# Patient Record
Sex: FEMALE | HISPANIC OR LATINO | Employment: UNEMPLOYED | URBAN - METROPOLITAN AREA
[De-identification: names, ages, dates, MRNs, and addresses within clinical notes are randomized per-mention and may not be internally consistent; named-entity substitution may affect disease eponyms.]

---

## 2022-11-16 ENCOUNTER — TELEPHONE (OUTPATIENT)
Dept: FAMILY MEDICINE | Facility: CLINIC | Age: 37
End: 2022-11-16

## 2022-11-16 ENCOUNTER — MEDICAL CORRESPONDENCE (OUTPATIENT)
Dept: HEALTH INFORMATION MANAGEMENT | Facility: CLINIC | Age: 37
End: 2022-11-16

## 2022-12-21 ENCOUNTER — MEDICAL CORRESPONDENCE (OUTPATIENT)
Dept: HEALTH INFORMATION MANAGEMENT | Facility: CLINIC | Age: 37
End: 2022-12-21

## 2023-03-02 ENCOUNTER — MEDICAL CORRESPONDENCE (OUTPATIENT)
Dept: HEALTH INFORMATION MANAGEMENT | Facility: CLINIC | Age: 38
End: 2023-03-02

## 2023-05-04 ENCOUNTER — HOSPITAL ENCOUNTER (OUTPATIENT)
Facility: HOSPITAL | Age: 38
Setting detail: OBSERVATION
Discharge: HOME | End: 2023-05-04
Attending: EMERGENCY MEDICINE | Admitting: SURGERY
Payer: COMMERCIAL

## 2023-05-04 ENCOUNTER — ANESTHESIA (OUTPATIENT)
Dept: SURGERY | Facility: HOSPITAL | Age: 38
End: 2023-05-04
Payer: COMMERCIAL

## 2023-05-04 ENCOUNTER — ANESTHESIA EVENT (OUTPATIENT)
Dept: SURGERY | Facility: HOSPITAL | Age: 38
End: 2023-05-04
Payer: COMMERCIAL

## 2023-05-04 ENCOUNTER — HOSPITAL ENCOUNTER (OUTPATIENT)
Facility: HOSPITAL | Age: 38
End: 2023-05-04
Attending: SURGERY | Admitting: SURGERY
Payer: COMMERCIAL

## 2023-05-04 ENCOUNTER — APPOINTMENT (OUTPATIENT)
Dept: ULTRASOUND IMAGING | Facility: HOSPITAL | Age: 38
End: 2023-05-04
Attending: EMERGENCY MEDICINE
Payer: COMMERCIAL

## 2023-05-04 VITALS
WEIGHT: 159 LBS | TEMPERATURE: 97.4 F | SYSTOLIC BLOOD PRESSURE: 111 MMHG | BODY MASS INDEX: 28.17 KG/M2 | DIASTOLIC BLOOD PRESSURE: 69 MMHG | HEART RATE: 64 BPM | RESPIRATION RATE: 12 BRPM | HEIGHT: 63 IN | OXYGEN SATURATION: 100 %

## 2023-05-04 DIAGNOSIS — K80.20 SYMPTOMATIC CHOLELITHIASIS: Primary | ICD-10-CM

## 2023-05-04 DIAGNOSIS — K80.20 CALCULUS OF GALLBLADDER WITHOUT CHOLECYSTITIS WITHOUT OBSTRUCTION: ICD-10-CM

## 2023-05-04 DIAGNOSIS — K80.50 BILIARY COLIC: ICD-10-CM

## 2023-05-04 PROBLEM — N89.8 VAGINAL DISCHARGE: Status: ACTIVE | Noted: 2022-10-17

## 2023-05-04 LAB
ALBUMIN SERPL BCG-MCNC: 4.5 G/DL (ref 3.5–5.2)
ALP SERPL-CCNC: 92 U/L (ref 35–104)
ALT SERPL W P-5'-P-CCNC: 17 U/L (ref 10–35)
ANION GAP SERPL CALCULATED.3IONS-SCNC: 13 MMOL/L (ref 7–15)
AST SERPL W P-5'-P-CCNC: 30 U/L (ref 10–35)
BASOPHILS # BLD AUTO: 0 10E3/UL (ref 0–0.2)
BASOPHILS NFR BLD AUTO: 0 %
BILIRUB DIRECT SERPL-MCNC: <0.2 MG/DL (ref 0–0.3)
BILIRUB SERPL-MCNC: 0.3 MG/DL
BUN SERPL-MCNC: 8.8 MG/DL (ref 6–20)
CALCIUM SERPL-MCNC: 9.5 MG/DL (ref 8.6–10)
CHLORIDE SERPL-SCNC: 102 MMOL/L (ref 98–107)
CREAT SERPL-MCNC: 0.57 MG/DL (ref 0.51–0.95)
DEPRECATED HCO3 PLAS-SCNC: 22 MMOL/L (ref 22–29)
EOSINOPHIL # BLD AUTO: 0.4 10E3/UL (ref 0–0.7)
EOSINOPHIL NFR BLD AUTO: 4 %
ERYTHROCYTE [DISTWIDTH] IN BLOOD BY AUTOMATED COUNT: 11.9 % (ref 10–15)
GFR SERPL CREATININE-BSD FRML MDRD: >90 ML/MIN/1.73M2
GLUCOSE SERPL-MCNC: 92 MG/DL (ref 70–99)
HCG INTACT+B SERPL-ACNC: <1 MIU/ML
HCT VFR BLD AUTO: 40.4 % (ref 35–47)
HGB BLD-MCNC: 13.4 G/DL (ref 11.7–15.7)
HOLD SPECIMEN: NORMAL
IMM GRANULOCYTES # BLD: 0 10E3/UL
IMM GRANULOCYTES NFR BLD: 0 %
LIPASE SERPL-CCNC: 36 U/L (ref 13–60)
LYMPHOCYTES # BLD AUTO: 3.3 10E3/UL (ref 0.8–5.3)
LYMPHOCYTES NFR BLD AUTO: 40 %
MCH RBC QN AUTO: 31.2 PG (ref 26.5–33)
MCHC RBC AUTO-ENTMCNC: 33.2 G/DL (ref 31.5–36.5)
MCV RBC AUTO: 94 FL (ref 78–100)
MONOCYTES # BLD AUTO: 0.6 10E3/UL (ref 0–1.3)
MONOCYTES NFR BLD AUTO: 8 %
NEUTROPHILS # BLD AUTO: 3.9 10E3/UL (ref 1.6–8.3)
NEUTROPHILS NFR BLD AUTO: 48 %
NRBC # BLD AUTO: 0 10E3/UL
NRBC BLD AUTO-RTO: 0 /100
PLATELET # BLD AUTO: 297 10E3/UL (ref 150–450)
POTASSIUM SERPL-SCNC: 3.9 MMOL/L (ref 3.4–5.3)
PROT SERPL-MCNC: 8 G/DL (ref 6.4–8.3)
RBC # BLD AUTO: 4.3 10E6/UL (ref 3.8–5.2)
SODIUM SERPL-SCNC: 137 MMOL/L (ref 136–145)
WBC # BLD AUTO: 8.2 10E3/UL (ref 4–11)

## 2023-05-04 PROCEDURE — 36415 COLL VENOUS BLD VENIPUNCTURE: CPT | Performed by: EMERGENCY MEDICINE

## 2023-05-04 PROCEDURE — 96375 TX/PRO/DX INJ NEW DRUG ADDON: CPT

## 2023-05-04 PROCEDURE — 250N000011 HC RX IP 250 OP 636: Performed by: EMERGENCY MEDICINE

## 2023-05-04 PROCEDURE — 96374 THER/PROPH/DIAG INJ IV PUSH: CPT

## 2023-05-04 PROCEDURE — 80053 COMPREHEN METABOLIC PANEL: CPT | Performed by: EMERGENCY MEDICINE

## 2023-05-04 PROCEDURE — 250N000013 HC RX MED GY IP 250 OP 250 PS 637: Performed by: EMERGENCY MEDICINE

## 2023-05-04 PROCEDURE — 83690 ASSAY OF LIPASE: CPT | Performed by: EMERGENCY MEDICINE

## 2023-05-04 PROCEDURE — 999N000141 HC STATISTIC PRE-PROCEDURE NURSING ASSESSMENT: Performed by: SURGERY

## 2023-05-04 PROCEDURE — 82248 BILIRUBIN DIRECT: CPT | Performed by: EMERGENCY MEDICINE

## 2023-05-04 PROCEDURE — 76705 ECHO EXAM OF ABDOMEN: CPT

## 2023-05-04 PROCEDURE — 85025 COMPLETE CBC W/AUTO DIFF WBC: CPT | Performed by: EMERGENCY MEDICINE

## 2023-05-04 PROCEDURE — 99285 EMERGENCY DEPT VISIT HI MDM: CPT | Mod: 25

## 2023-05-04 PROCEDURE — 258N000003 HC RX IP 258 OP 636: Performed by: EMERGENCY MEDICINE

## 2023-05-04 PROCEDURE — 84702 CHORIONIC GONADOTROPIN TEST: CPT | Performed by: EMERGENCY MEDICINE

## 2023-05-04 PROCEDURE — G0378 HOSPITAL OBSERVATION PER HR: HCPCS

## 2023-05-04 PROCEDURE — 99253 IP/OBS CNSLTJ NEW/EST LOW 45: CPT | Performed by: SURGERY

## 2023-05-04 RX ORDER — SODIUM CHLORIDE 9 MG/ML
INJECTION, SOLUTION INTRAVENOUS ONCE
Status: COMPLETED | OUTPATIENT
Start: 2023-05-04 | End: 2023-05-04

## 2023-05-04 RX ORDER — HEPARIN SODIUM 5000 [USP'U]/.5ML
5000 INJECTION, SOLUTION INTRAVENOUS; SUBCUTANEOUS
Status: DISCONTINUED | OUTPATIENT
Start: 2023-05-04 | End: 2023-05-04 | Stop reason: HOSPADM

## 2023-05-04 RX ORDER — LIDOCAINE 40 MG/G
CREAM TOPICAL
Status: DISCONTINUED | OUTPATIENT
Start: 2023-05-04 | End: 2023-05-04 | Stop reason: HOSPADM

## 2023-05-04 RX ORDER — MECLIZINE HCL 12.5 MG 12.5 MG/1
25 TABLET ORAL ONCE
Status: COMPLETED | OUTPATIENT
Start: 2023-05-04 | End: 2023-05-04

## 2023-05-04 RX ORDER — OXYCODONE HYDROCHLORIDE 5 MG/1
5 TABLET ORAL EVERY 6 HOURS PRN
Qty: 6 TABLET | Refills: 0 | Status: SHIPPED | OUTPATIENT
Start: 2023-05-04 | End: 2023-05-08

## 2023-05-04 RX ORDER — DIPHENHYDRAMINE HYDROCHLORIDE 50 MG/ML
12.5 INJECTION INTRAMUSCULAR; INTRAVENOUS ONCE
Status: COMPLETED | OUTPATIENT
Start: 2023-05-04 | End: 2023-05-04

## 2023-05-04 RX ORDER — SODIUM CHLORIDE, SODIUM LACTATE, POTASSIUM CHLORIDE, CALCIUM CHLORIDE 600; 310; 30; 20 MG/100ML; MG/100ML; MG/100ML; MG/100ML
INJECTION, SOLUTION INTRAVENOUS CONTINUOUS
Status: DISCONTINUED | OUTPATIENT
Start: 2023-05-04 | End: 2023-05-04 | Stop reason: HOSPADM

## 2023-05-04 RX ORDER — CLINDAMYCIN PHOSPHATE 900 MG/50ML
900 INJECTION, SOLUTION INTRAVENOUS
Status: DISCONTINUED | OUTPATIENT
Start: 2023-05-04 | End: 2023-05-04 | Stop reason: HOSPADM

## 2023-05-04 RX ORDER — ONDANSETRON 2 MG/ML
8 INJECTION INTRAMUSCULAR; INTRAVENOUS ONCE
Status: COMPLETED | OUTPATIENT
Start: 2023-05-04 | End: 2023-05-04

## 2023-05-04 RX ORDER — METOCLOPRAMIDE HYDROCHLORIDE 5 MG/ML
10 INJECTION INTRAMUSCULAR; INTRAVENOUS ONCE
Status: COMPLETED | OUTPATIENT
Start: 2023-05-04 | End: 2023-05-04

## 2023-05-04 RX ORDER — KETOROLAC TROMETHAMINE 15 MG/ML
15 INJECTION, SOLUTION INTRAMUSCULAR; INTRAVENOUS ONCE
Status: COMPLETED | OUTPATIENT
Start: 2023-05-04 | End: 2023-05-04

## 2023-05-04 RX ORDER — CLINDAMYCIN PHOSPHATE 900 MG/50ML
900 INJECTION, SOLUTION INTRAVENOUS SEE ADMIN INSTRUCTIONS
Status: DISCONTINUED | OUTPATIENT
Start: 2023-05-04 | End: 2023-05-04 | Stop reason: HOSPADM

## 2023-05-04 RX ORDER — HYDROMORPHONE HYDROCHLORIDE 1 MG/ML
0.5 INJECTION, SOLUTION INTRAMUSCULAR; INTRAVENOUS; SUBCUTANEOUS
Status: DISCONTINUED | OUTPATIENT
Start: 2023-05-04 | End: 2023-05-04 | Stop reason: HOSPADM

## 2023-05-04 RX ADMIN — ONDANSETRON 8 MG: 2 INJECTION INTRAMUSCULAR; INTRAVENOUS at 12:19

## 2023-05-04 RX ADMIN — HYDROMORPHONE HYDROCHLORIDE 0.5 MG: 1 INJECTION, SOLUTION INTRAMUSCULAR; INTRAVENOUS; SUBCUTANEOUS at 12:17

## 2023-05-04 RX ADMIN — METOCLOPRAMIDE 10 MG: 5 INJECTION, SOLUTION INTRAMUSCULAR; INTRAVENOUS at 15:06

## 2023-05-04 RX ADMIN — DIPHENHYDRAMINE HYDROCHLORIDE 12.5 MG: 50 INJECTION, SOLUTION INTRAMUSCULAR; INTRAVENOUS at 15:05

## 2023-05-04 RX ADMIN — SODIUM CHLORIDE: 9 INJECTION, SOLUTION INTRAVENOUS at 14:55

## 2023-05-04 RX ADMIN — MECLIZINE 25 MG: 12.5 TABLET ORAL at 13:22

## 2023-05-04 RX ADMIN — KETOROLAC TROMETHAMINE 15 MG: 15 INJECTION, SOLUTION INTRAMUSCULAR; INTRAVENOUS at 12:16

## 2023-05-04 ASSESSMENT — ENCOUNTER SYMPTOMS
CHILLS: 1
FEVER: 0
ABDOMINAL PAIN: 1
DIZZINESS: 1
HEADACHES: 1
NAUSEA: 1

## 2023-05-04 ASSESSMENT — ACTIVITIES OF DAILY LIVING (ADL)
ADLS_ACUITY_SCORE: 35
ADLS_ACUITY_SCORE: 35
DEPENDENT_IADLS:: INDEPENDENT
ADLS_ACUITY_SCORE: 35
ADLS_ACUITY_SCORE: 35

## 2023-05-04 NOTE — PHARMACY-ADMISSION MEDICATION HISTORY
Pharmacist Admission Medication History    Admission medication history is complete. The information provided in this note is only as accurate as the sources available at the time of the update.    Medication reconciliation/reorder completed by provider prior to medication history? No    Information Source(s): Patient via phone    Pertinent Information: Patient takes no medications PTA.    Changes made to PTA medication list:    Added: None    Deleted: None    Changed: None    Medication Affordability:  Not including over the counter (OTC) medications, was there a time in the past 12 months when you did not take your medications as prescribed because of cost?: No    Allergies reviewed with patient and updates made in EHR: yes    Medication History Completed By: ERIC MCKEON RPH 5/4/2023 2:54 PM    Prior to Admission medications    Not on File

## 2023-05-04 NOTE — ED PROVIDER NOTES
EMERGENCY DEPARTMENT ENCOUNTER      NAME: Caitlyn Hu  AGE: 37 year old female  YOB: 1985  MRN: [id number]  EVALUATION DATE & TIME: 5/4/2023 12:11 PM    PCP: No primary care provider on file.    ED PROVIDER: Cameron Moore M.D.      Chief Complaint   Patient presents with     Abdominal Pain         FINAL IMPRESSION:  1. Biliary colic    2. Calculus of gallbladder without cholecystitis without obstruction          ED COURSE & MEDICAL DECISION MAKING:    Pertinent Labs & Imaging studies reviewed below.  All EKGs below represent my independent interpretation.   ED Course as of 05/04/23 1519   u May 04, 2023   1226 Patient is a 37-year-old woman who presents with epigastric department regarding pain an hour prior to arrival.  She is nauseous, has vomited, tenderness to the right upper quadrant.  She reports a history of biliary colic, but has not been able to see outpatient surgery.  Symptoms consistent with biliary colic, but plan to screen blood work with leukocytosis, signs of biliary obstruction.  Ultrasound ordered.  IV analgesia, antiemetics ordered.   1229 Bilirubin Direct: <0.20   1433 Abdomen US, limited (RUQ only)  1.  Cholelithiasis in the otherwise normal-appearing gallbladder. Positive sonographic Ochoa's sign. Relief of pain after gallbladder neck stone dislodged as described above.   1433 Surgery paged   1444 Updated the patient, discussed my recommendation for admission for symptomatic management, surgery consult, likely cholecystectomy tomorrow.  I also ordered Reglan, Benadryl, she reports having headache at this time.     I spoke to Dr. Cesario Zuniga who agrees with plan for cholecystectomy based on my description and lab work.  He is in the operating room currently, but will need to swing by or have someone discuss with patient plan for cholecystectomy either tonight or tomorrow.    Additional ED Course Timestamps:  12:02 PM I met with the patient to gather history and to  perform my initial exam. I discussed the plan for care while in the Emergency Department. PPE was worn during patient encounters.     Medical Decision Making    History:    Supplemental history from: Documented in chart, if applicable    External Record(s) reviewed: Documented in chart, if applicable.    Work Up:    Chart documentation includes differential considered and any EKGs or imaging independently interpreted by provider, where specified.    In additional to work up documented, I considered the following work up: Documented in chart, if applicable.    External consultation:    Discussion of management with another provider: Documented in chart, if applicable    Complicating factors:    Care impacted by chronic illness: N/A    Care affected by social determinants of health: N/A    Disposition considerations: Admit.        At the conclusion of the encounter I discussed the results of all of the tests and the disposition. The questions were answered. The patient or family acknowledged understanding and was agreeable with the care plan.       MEDICATIONS GIVEN IN THE EMERGENCY:  Medications   HYDROmorphone (PF) (DILAUDID) injection 0.5 mg (0.5 mg Intravenous $Given 5/4/23 1217)   ketorolac (TORADOL) injection 15 mg (15 mg Intravenous $Given 5/4/23 1216)   ondansetron (ZOFRAN) injection 8 mg (8 mg Intravenous $Given 5/4/23 1219)   meclizine (ANTIVERT) tablet 25 mg (25 mg Oral $Given 5/4/23 1322)   metoclopramide (REGLAN) injection 10 mg (10 mg Intravenous $Given 5/4/23 1506)   diphenhydrAMINE (BENADRYL) injection 12.5 mg (12.5 mg Intravenous $Given 5/4/23 1505)   sodium chloride 0.9% infusion ( Intravenous $New Bag 5/4/23 1455)         NEW PRESCRIPTIONS STARTED AT TODAY'S ER VISIT  New Prescriptions    No medications on file          =================================================================    HPI    Patient information was obtained from: patient    Use of : Yes (Phone) - Language Cook Islander       "  Caitlyn Hu is a 37 year old female with no contributory medical history who presents to this ED for evaluation of abdominal pain. Patient reports that she had onset of severe epigastric abdominal pain with radiation to her right upper quadrant. She reports associated nausea, headache, dizziness, and chills.    Patient reports that 1 month ago, she was in the hospital, and told that she may need her gallbladder removed, but she never received a call from the general surgeon to schedule her cholecystectomy. Patient denies chance of pregnancy. Patient denies fever, or additional medical concerns or complaints at this time.        Per chart review, patient was seen at [facility] ED on 2/24/23 for evaluation of abdominal pain. Patient's labs were unremarkable . Patient was provided with a surgery referral for follow-up in clinic if she remained symptomatic.     REVIEW OF SYSTEMS   Review of Systems   Constitutional: Positive for chills. Negative for fever.   Gastrointestinal: Positive for abdominal pain and nausea.   Neurological: Positive for dizziness and headaches.   All other systems reviewed and are negative.       VITALS:  /65   Pulse 73   Temp 97.4  F (36.3  C) (Temporal)   Resp 20   Ht 1.6 m (5' 3\")   Wt 72.1 kg (159 lb)   SpO2 99%   BMI 28.17 kg/m      PHYSICAL EXAM    Constitutional: Well developed, well nourished. Uncomfortable appearing.  HENT: Normocephalic, atraumatic, mucous membranes moist, nose normal. Neck- Supple, gross ROM intact.   Eyes: Pupils mid-range, conjunctiva without injection, no discharge.   Respiratory: Clear to auscultation bilaterally, no respiratory distress, no wheezing, speaks full sentences easily. No cough.  Cardiovascular: Normal heart rate, regular rhythm, no murmurs.   GI: Soft, no masses. Tenderness to palpation in epigastric region and RUQ.  Musculoskeletal: Moving all 4 extremities intentionally and without pain. No obvious deformity.  Skin: Warm, " dry, no rash.  Neurologic: Alert & oriented x 3, cranial nerves grossly intact.  Psychiatric: Affect normal, cooperative.        I, Elise Gryler am serving as a scribe to document services personally performed by Dr. Cameron Moore based on my observation and the provider's statements to me. I, Cameron Moore MD attest that Elise Gryler is acting in a scribe capacity, has observed my performance of the services and has documented them in accordance with my direction.    Cameron Moore M.D.  Emergency Medicine  [facility] EMERGENCY DEPARTMENT  [address] [place] [id number]  [phone]  Dept: [phone]       Cameron Moore MD  05/04/23 2532

## 2023-05-04 NOTE — ED TRIAGE NOTES
The pt presents with abdominal pain 10/10 states she was suppose to have gallbladder surgery a month ago. The pt states nausea and vomiting multiple times. The pt appears very uncomfortable.

## 2023-05-04 NOTE — PLAN OF CARE
Pt discharged from [facility]. Procedure not done, explanation by surgeon.  Paperwork sent with patient. Discharge teaching completed with aide of .

## 2023-05-04 NOTE — CONSULTS
"History:   Caitlyn Hutchison is a 37 year old female referred to general surgery by Dr. Moore for cholelithiasis.  History is obtained with the assistance of a  over the phone.  She presented to the ED with a complaint of abdominal pain.  It started around 11 AM.  The pain has been located in the epigastrium.  She describes it as crampy.  It radiated to her back.  It was associated with nausea and vomiting.  She has had symptoms like this in the past.  1 month ago she was in [place] and was diagnosed with gallstones.  She was told that she would have follow-up with surgery, but it never happened.  She has had associated symptoms of gassiness and bloating.  She feels like her epigastrium was very hard.  Her symptoms have now resolved.  While she was in radiology she was found to have a gallstone at the neck of the gallbladder.  When turning prone, the stone moved out of the neck and her symptoms went away.      Allergies:  Penicillins and Latex    Past medical history:  Denies    Past surgical history:  Appendectomy  Tubal ligation    Current medications:  Denies    Family history:  No known family history of anesthesia problems    Social history:  Denies alcohol, tobacco, illicit drug use    Review of Systems:  General: No complaints or constitutional symptoms  Skin: No complaints or symptoms   Hematologic/Lymphatic: No symptoms or complaints  Psychiatric: No symptoms or complaints  Endocrine: No excessive fatigue, no hypermetabolic symptoms reported  Respiratory: No cough, shortness of breath, or wheezing  Cardiovascular: No chest pain or dyspnea on exertion  Gastrointestinal: As per HPI  Musculoskeletal: No recent injuries reported  Neurological: No focal neurologic defects reported.      Exam:  /69 (BP Location: Left arm)   Pulse 64   Temp 97.4  F (36.3  C) (Temporal)   Resp 12   Ht 1.6 m (5' 3\")   Wt 72.1 kg (159 lb)   LMP 05/04/2023   SpO2 100%   BMI 28.17 kg/m  "   Body mass index is 28.17 kg/m .  General: Alert, cooperative, appears stated age   Skin: Skin color, texture, turgor normal, no rashes or lesions   Lymphatic: No obvious adenopathy, no swelling   Eyes: No scleral icterus, pupils equal  HENT: No traumatic injury to the head or face, no gross abnormalities  Lungs: Normal respiratory effort, breath sounds equal bilaterally  Heart: Regular rate and rhythm  Abdomen: Soft, nondistended, nontender to palpation  Musculoskeletal: No obvious swelling or deformities  Neurologic: Grossly intact      Imaging:   Pertinent images personally reviewed by myself and discussed with the patient.    Radiology reports:  EXAM: US ABDOMEN LIMITED  LOCATION: [facility]  DATE/TIME: 5/4/2023 2:17 PM CDT     INDICATION: Biliary colic.  COMPARISON: None.  TECHNIQUE: Limited abdominal ultrasound.     FINDINGS:  GALLBLADDER: Shadowing 2.5 cm and 1.5 cm gallstones. The 1.5 cm gallstone was lodged in the neck of the gallbladder until the patient was moved to a prone position when it dislodged and simultaneously the patient experienced relief of pain. No gallbladder wall thickening or pericholecystic fluid. Positive sonographic Ochoa's sign.  BILE DUCTS: No biliary dilatation. The common duct measures 3 mm.  LIVER: Normal parenchyma with smooth contour. No focal mass.  RIGHT KIDNEY: No hydronephrosis.  PANCREAS: The visualized portions are normal.  No ascites.                                                                   IMPRESSION:  1.  Cholelithiasis in the otherwise normal-appearing gallbladder. Positive sonographic Ochoa's sign. Relief of pain after gallbladder neck stone dislodged as described above.       Assessment/Plan:   Caitlyn Hutchison is a 37 year old female with signs and symptoms consistent with biliary colic.  I have explained the pathophysiology of gallbladder disease in detail as well as the surgical versus non-operative management  strategies.  The risks of surgery and anesthesia include, but are not limited to, bleeding, infection, injury to surrounding structures, the need to convert to an open procedure, blood clots, stroke, heart attack and death.  Specifically we discussed the risk of damage to the common bile duct and hepatic vessels, and the complications that may arise from damage to these structures.  Additionally, the risks of non-operative management were discussed which include, but are not limited to, infection, recurrent pain, sepsis and death.     She understands everything which was discussed and is interested in pursuing surgical management.  Unfortunately the OR is backed up and bumping cases.  There is not a reasonable time for surgery this evening.  Therefore, she will be discharged home with outpatient follow-up for cholecystectomy.  Preoperative orders have been placed for laparoscopic cholecystectomy.  My surgery scheduler will give her a call to pick a date for her cholecystectomy.     Adelaida Miranda DO  General Surgeon  [facility]  Surgery [address]  [place] [address]  Office: [phone]  Employed by - [facility]

## 2023-05-04 NOTE — ED NOTES
Provider notified of pt's decreasing blood pressures as well as her dizziness since Dilaudid was given. New order placed.

## 2023-05-04 NOTE — PROGRESS NOTES
37-year-old female presenting with symptomatic cholelithiasis.  The general surgery team was consulted to evaluate this patient.  Our plan is to proceed with laparoscopic cholecystectomy around 5:30 PM.  Likely patient will be discharged thereafter.    Plan  -Keep n.p.o. for now  -Plan for OR  -Full H&P to follow    Cesario Zuniga DO  General Surgeon  [facility]  Surgery [facility] - [address] [address]?  Office: [phone]  Employed by - [facility]  Pager: [phone]

## 2023-05-04 NOTE — CONSULTS
Care Management Initial Consult    General Information  Assessment completed with: Caitlyn Wells via phone with   Type of CM/SW Visit: Initial Assessment    Primary Care Provider verified and updated as needed: Yes   Readmission within the last 30 days: no previous admission in last 30 days      Reason for Consult: discharge planning  Advance Care Planning: Advance Care Planning Reviewed: no concerns identified          Communication Assessment  Patient's communication style: spoken language (non-English) (speaks Azeri)             Cognitive  Cognitive/Neuro/Behavioral:                        Living Environment:   People in home: spouse, child(edilson), dependent     Current living Arrangements: house      Able to return to prior arrangements: yes       Family/Social Support:  Care provided by: self  Provides care for: child(edilson)  Marital Status:     Anuj       Description of Support System: Supportive, Involved    Support Assessment: Adequate family and caregiver support, Adequate social supports, Patient communicates needs well met    Current Resources:   Patient receiving home care services: No     Community Resources: None  Equipment currently used at home:    Supplies currently used at home: None    Employment/Financial:  Employment Status: unemployed        Financial Concerns:     Referral to Financial Worker: No       Does the patient's insurance plan have a 3 day qualifying hospital stay waiver?  No    Lifestyle & Psychosocial Needs:  Social Determinants of Health     Tobacco Use: Not on file   Alcohol Use: Not on file   Financial Resource Strain: Not on file   Food Insecurity: Not on file   Transportation Needs: Not on file   Physical Activity: Not on file   Stress: Not on file   Social Connections: Not on file   Intimate Partner Violence: Not on file   Depression: Not on file   Housing Stability: Not on file       Functional Status:  Prior to admission patient needed assistance:    Dependent ADLs:: Independent, Ambulation-no assistive device  Dependent IADLs:: Independent  Assesssment of Functional Status: At functional baseline    Mental Health Status:          Chemical Dependency Status:                Values/Beliefs:  Spiritual, Cultural Beliefs, Episcopal Practices, Values that affect care:                 Additional Information:  Caitlyn lives in a house with her  and kids. She is independent with ADLs at baseline.    Likely home with no new needs at this time.    Family to transport at discharge.    CM to follow for medical progression of care, discharge recommendations, and final discharge plan.    Linda Welch RN

## 2023-05-05 ENCOUNTER — TELEPHONE (OUTPATIENT)
Dept: SURGERY | Facility: CLINIC | Age: 38
End: 2023-05-05
Payer: COMMERCIAL

## 2023-05-05 NOTE — TELEPHONE ENCOUNTER
Talked with Caitlyn through a  and scheduled her surgery with Dr. Zuniga for 05.11.23. We went over all details in case she doesn't get the confirmation letter in the mail in time. She's in agreement with the plan.

## 2023-05-05 NOTE — LETTER
Thank you for choosing [facility] General Surgery for your care. You are scheduled for surgery with Dr. Zuniga. Details are as follows:    Pre-op Physical: Dr. Zuniga will complete in pre-op the day of surgery    Surgery Date: 05/11/2023     Location: [place] [address]. Check in on 3rd floor; Left off the elevator    Approximate Arrival Time: 9:00 a.m. (time subject to change)  Surgery Start Time: 10:00 a.m. (time subject to change)     Prep Tasks and Info:     Review your medications with your primary care or prescribing physician; they will advise you which meds to stop and when, and when you can resume taking.  Certain medications like blood thinners, need to be stopped in advance of surgery to proceed safely.    You must get tested for COVID-19, even if you are vaccinated.    Outpatient Surgery:  If you are going home the same day of surgery, a home rapid antigen Covid-19 test is required 1-2 days before surgery- regardless of your vaccination status.  Take a photo of the negative result and show to the nurse on the day of surgery. If you test positive, contact our office right away to reschedule surgery. You can buy a home Covid-19 Rapid Antigen test at many local pharmacies, or you can order for free at covid.gov/tests.    Please shower the evening before and morning of surgery with Hibiclens soap. This can be found at your local pharmacy.     Fasting instructions will be provided by the pre-op nurse who will call you 1-3 days before surgery.  Typically we advise normal food up to 8 hours before you arrive for surgery. Clear liquids only from then until 2 hours before you arrive surgery then nothing at all by mouth.  The nurse will review your specific instructions with you at the call.     Smoking impacts your body's ability to heal properly.  If you are a smoker, we strongly urge you to stop smoking 4-6 weeks before surgery. Plastic surgery  patients ar required to be off nicotine for at least 8 weeks before surgery.    You will need an adult to drive you home and stay with you 24 hours after surgery. Public transportation or Medical Van Services are not permitted.    You may have one family member wait in the lobby at the surgery center during your surgery. Visitor restrictions are subject to change, please verify with the pre-op nurse when they call.    If the community sees a new COVID19 surge, your procedure may need to be postponed. We will contact you if this happens. You will be screened for high-risk exposure to Covid-19 during the pre-op call.  We encourage you to quarantine yourself away from any Covid-19 people for 10 days before surgery to avoid possible last minute cancellations.   When you arrive to the surgery center, you will again be screened for COVID19 symptoms. If you screen positive, your surgery will need to be postponed.    We always encourage you to notify your insurance any time you have medical tests or procedures scheduled including surgery. The number is usually right on the back of your insurance card. Please call [facility] Cost of Care at [phone]  if you'd like a surgery quote.     Call our office if you have any questions! Thank you!

## 2023-05-10 ENCOUNTER — ANESTHESIA EVENT (OUTPATIENT)
Dept: SURGERY | Facility: AMBULATORY SURGERY CENTER | Age: 38
End: 2023-05-10
Payer: COMMERCIAL

## 2023-05-11 ENCOUNTER — ANESTHESIA (OUTPATIENT)
Dept: SURGERY | Facility: AMBULATORY SURGERY CENTER | Age: 38
End: 2023-05-11
Payer: COMMERCIAL

## 2023-05-11 ENCOUNTER — HOSPITAL ENCOUNTER (OUTPATIENT)
Facility: AMBULATORY SURGERY CENTER | Age: 38
Discharge: HOME | End: 2023-05-11
Attending: SURGERY
Payer: COMMERCIAL

## 2023-05-11 VITALS
DIASTOLIC BLOOD PRESSURE: 77 MMHG | RESPIRATION RATE: 16 BRPM | BODY MASS INDEX: 24.63 KG/M2 | HEART RATE: 62 BPM | WEIGHT: 139 LBS | HEIGHT: 63 IN | SYSTOLIC BLOOD PRESSURE: 123 MMHG | OXYGEN SATURATION: 97 % | TEMPERATURE: 97.2 F

## 2023-05-11 DIAGNOSIS — K80.50 BILIARY COLIC: Primary | ICD-10-CM

## 2023-05-11 LAB
HCG UR QL: NEGATIVE
INTERNAL QC OK POCT: NORMAL
POCT KIT EXPIRATION DATE: NORMAL
POCT KIT LOT NUMBER: NORMAL

## 2023-05-11 PROCEDURE — 47562 LAPAROSCOPIC CHOLECYSTECTOMY: CPT | Performed by: SURGERY

## 2023-05-11 RX ORDER — SODIUM CHLORIDE, SODIUM LACTATE, POTASSIUM CHLORIDE, CALCIUM CHLORIDE 600; 310; 30; 20 MG/100ML; MG/100ML; MG/100ML; MG/100ML
INJECTION, SOLUTION INTRAVENOUS CONTINUOUS
Status: DISCONTINUED | OUTPATIENT
Start: 2023-05-11 | End: 2023-05-12 | Stop reason: HOSPADM

## 2023-05-11 RX ORDER — BUPIVACAINE HYDROCHLORIDE 2.5 MG/ML
INJECTION, SOLUTION INFILTRATION; PERINEURAL PRN
Status: DISCONTINUED | OUTPATIENT
Start: 2023-05-11 | End: 2023-05-11 | Stop reason: HOSPADM

## 2023-05-11 RX ORDER — FENTANYL CITRATE 50 UG/ML
INJECTION, SOLUTION INTRAMUSCULAR; INTRAVENOUS PRN
Status: DISCONTINUED | OUTPATIENT
Start: 2023-05-11 | End: 2023-05-11

## 2023-05-11 RX ORDER — ONDANSETRON 4 MG/1
4 TABLET, ORALLY DISINTEGRATING ORAL EVERY 30 MIN PRN
Status: DISCONTINUED | OUTPATIENT
Start: 2023-05-11 | End: 2023-05-12 | Stop reason: HOSPADM

## 2023-05-11 RX ORDER — HYDROCODONE BITARTRATE AND ACETAMINOPHEN 5; 325 MG/1; MG/1
1 TABLET ORAL EVERY 6 HOURS PRN
Qty: 12 TABLET | Refills: 0 | Status: SHIPPED | OUTPATIENT
Start: 2023-05-11 | End: 2023-05-14

## 2023-05-11 RX ORDER — ONDANSETRON 2 MG/ML
INJECTION INTRAMUSCULAR; INTRAVENOUS PRN
Status: DISCONTINUED | OUTPATIENT
Start: 2023-05-11 | End: 2023-05-11

## 2023-05-11 RX ORDER — LIDOCAINE HYDROCHLORIDE 20 MG/ML
INJECTION, SOLUTION INFILTRATION; PERINEURAL PRN
Status: DISCONTINUED | OUTPATIENT
Start: 2023-05-11 | End: 2023-05-11

## 2023-05-11 RX ORDER — DEXAMETHASONE SODIUM PHOSPHATE 4 MG/ML
INJECTION, SOLUTION INTRA-ARTICULAR; INTRALESIONAL; INTRAMUSCULAR; INTRAVENOUS; SOFT TISSUE PRN
Status: DISCONTINUED | OUTPATIENT
Start: 2023-05-11 | End: 2023-05-11

## 2023-05-11 RX ORDER — FENTANYL CITRATE 0.05 MG/ML
50 INJECTION, SOLUTION INTRAMUSCULAR; INTRAVENOUS EVERY 5 MIN PRN
Status: DISCONTINUED | OUTPATIENT
Start: 2023-05-11 | End: 2023-05-12 | Stop reason: HOSPADM

## 2023-05-11 RX ORDER — FENTANYL CITRATE 0.05 MG/ML
25 INJECTION, SOLUTION INTRAMUSCULAR; INTRAVENOUS EVERY 5 MIN PRN
Status: DISCONTINUED | OUTPATIENT
Start: 2023-05-11 | End: 2023-05-12 | Stop reason: HOSPADM

## 2023-05-11 RX ORDER — MAGNESIUM SULFATE HEPTAHYDRATE 40 MG/ML
4 INJECTION, SOLUTION INTRAVENOUS ONCE
Status: COMPLETED | OUTPATIENT
Start: 2023-05-11 | End: 2023-05-11

## 2023-05-11 RX ORDER — PROPOFOL 10 MG/ML
INJECTION, EMULSION INTRAVENOUS PRN
Status: DISCONTINUED | OUTPATIENT
Start: 2023-05-11 | End: 2023-05-11

## 2023-05-11 RX ORDER — OXYCODONE HYDROCHLORIDE 10 MG/1
10 TABLET ORAL
Status: DISCONTINUED | OUTPATIENT
Start: 2023-05-11 | End: 2023-05-12 | Stop reason: HOSPADM

## 2023-05-11 RX ORDER — HYDROMORPHONE HCL IN WATER/PF 6 MG/30 ML
0.2 PATIENT CONTROLLED ANALGESIA SYRINGE INTRAVENOUS EVERY 5 MIN PRN
Status: DISCONTINUED | OUTPATIENT
Start: 2023-05-11 | End: 2023-05-12 | Stop reason: HOSPADM

## 2023-05-11 RX ORDER — ACETAMINOPHEN 325 MG/1
975 TABLET ORAL ONCE
Status: COMPLETED | OUTPATIENT
Start: 2023-05-11 | End: 2023-05-11

## 2023-05-11 RX ORDER — PROPOFOL 10 MG/ML
INJECTION, EMULSION INTRAVENOUS CONTINUOUS PRN
Status: DISCONTINUED | OUTPATIENT
Start: 2023-05-11 | End: 2023-05-11

## 2023-05-11 RX ORDER — DOCUSATE SODIUM 100 MG/1
100 CAPSULE, LIQUID FILLED ORAL 2 TIMES DAILY
Qty: 30 CAPSULE | Refills: 0 | Status: SHIPPED | OUTPATIENT
Start: 2023-05-11

## 2023-05-11 RX ORDER — ONDANSETRON 2 MG/ML
4 INJECTION INTRAMUSCULAR; INTRAVENOUS EVERY 30 MIN PRN
Status: DISCONTINUED | OUTPATIENT
Start: 2023-05-11 | End: 2023-05-12 | Stop reason: HOSPADM

## 2023-05-11 RX ORDER — OXYCODONE HYDROCHLORIDE 5 MG/1
5 TABLET ORAL
Status: COMPLETED | OUTPATIENT
Start: 2023-05-11 | End: 2023-05-11

## 2023-05-11 RX ORDER — LIDOCAINE 40 MG/G
CREAM TOPICAL
Status: DISCONTINUED | OUTPATIENT
Start: 2023-05-11 | End: 2023-05-12 | Stop reason: HOSPADM

## 2023-05-11 RX ORDER — KETOROLAC TROMETHAMINE 30 MG/ML
INJECTION, SOLUTION INTRAMUSCULAR; INTRAVENOUS PRN
Status: DISCONTINUED | OUTPATIENT
Start: 2023-05-11 | End: 2023-05-11

## 2023-05-11 RX ORDER — HYDROMORPHONE HCL IN WATER/PF 6 MG/30 ML
0.4 PATIENT CONTROLLED ANALGESIA SYRINGE INTRAVENOUS EVERY 5 MIN PRN
Status: DISCONTINUED | OUTPATIENT
Start: 2023-05-11 | End: 2023-05-12 | Stop reason: HOSPADM

## 2023-05-11 RX ADMIN — KETOROLAC TROMETHAMINE 15 MG: 30 INJECTION, SOLUTION INTRAMUSCULAR; INTRAVENOUS at 10:41

## 2023-05-11 RX ADMIN — FENTANYL CITRATE 50 MCG: 50 INJECTION, SOLUTION INTRAMUSCULAR; INTRAVENOUS at 10:25

## 2023-05-11 RX ADMIN — FENTANYL CITRATE 100 MCG: 50 INJECTION, SOLUTION INTRAMUSCULAR; INTRAVENOUS at 10:02

## 2023-05-11 RX ADMIN — FENTANYL CITRATE 25 MCG: 0.05 INJECTION, SOLUTION INTRAMUSCULAR; INTRAVENOUS at 11:31

## 2023-05-11 RX ADMIN — OXYCODONE HYDROCHLORIDE 5 MG: 5 TABLET ORAL at 12:04

## 2023-05-11 RX ADMIN — FENTANYL CITRATE 25 MCG: 0.05 INJECTION, SOLUTION INTRAMUSCULAR; INTRAVENOUS at 11:20

## 2023-05-11 RX ADMIN — ACETAMINOPHEN 975 MG: 325 TABLET ORAL at 09:40

## 2023-05-11 RX ADMIN — PROPOFOL 150 MG: 10 INJECTION, EMULSION INTRAVENOUS at 10:02

## 2023-05-11 RX ADMIN — PROPOFOL 200 MCG/KG/MIN: 10 INJECTION, EMULSION INTRAVENOUS at 10:02

## 2023-05-11 RX ADMIN — Medication 100 MCG: at 10:09

## 2023-05-11 RX ADMIN — MAGNESIUM SULFATE HEPTAHYDRATE 4 G: 40 INJECTION, SOLUTION INTRAVENOUS at 09:42

## 2023-05-11 RX ADMIN — Medication 100 MCG: at 10:16

## 2023-05-11 RX ADMIN — SODIUM CHLORIDE, SODIUM LACTATE, POTASSIUM CHLORIDE, CALCIUM CHLORIDE: 600; 310; 30; 20 INJECTION, SOLUTION INTRAVENOUS at 09:42

## 2023-05-11 RX ADMIN — LIDOCAINE HYDROCHLORIDE 3 ML: 20 INJECTION, SOLUTION INFILTRATION; PERINEURAL at 10:02

## 2023-05-11 RX ADMIN — DEXAMETHASONE SODIUM PHOSPHATE 10 MG: 4 INJECTION, SOLUTION INTRA-ARTICULAR; INTRALESIONAL; INTRAMUSCULAR; INTRAVENOUS; SOFT TISSUE at 10:02

## 2023-05-11 RX ADMIN — ONDANSETRON 4 MG: 2 INJECTION INTRAMUSCULAR; INTRAVENOUS at 10:39

## 2023-05-11 RX ADMIN — Medication 50 MG: at 10:02

## 2023-05-11 NOTE — H&P
"General Surgery H&P  Caitlyn Hutchison MRN# [id number]   Age/Sex: 37 year old female YOB: 1985     Reason for visit:  Symptomatic cholelithiasis       Referring physician:  Cameron Moore MD                   Assessment and Plan:   Assessment:  1.  Symptomatic cholelithiasis    Plan:  -To the OR for laparoscopic cholecystectomy  - The risks and benefits of the procedure were explained detail to the patient. The risks include infection, bleeding, damage to the surrounding structures. Patient verbalized understanding provided consent to undergo the procedure above.  - Telephone  was used to obtain consent.          Chief Complaint:   Here for surgery     History is obtained from the patient    HPI:   Caitlyn Hutchison is a 37 year old female who presents to the [facility] today for laparoscopic cholecystectomy.  Patient has not had any acute changes to her medical history since last on that we have met.          Past Medical History:   History reviewed. No pertinent past medical history.           Past Surgical History:     Past Surgical History:   Procedure Laterality Date     APPENDECTOMY       GYN SURGERY               Social History:    reports that she has never smoked. She has never used smokeless tobacco. She reports that she does not use drugs.           Family History:   History reviewed. No pertinent family history.           Allergies:     Allergies   Allergen Reactions     Penicillins Anaphylaxis     \"throat closes\"  Pt has tolerated amoxicillin in the past       Latex Itching and Rash              Medications:     Prior to Admission medications    Not on File              Review of Systems:   A 12 point Review of Systems is negative other than noted in the HPI            Physical Exam:   No data found.     No intake or output data in the 24 hours ending 05/11/23 0806   Constitutional:   awake, alert, cooperative, no apparent distress, " and appears stated age       Eyes:   PERRL, conjunctiva/corneas clear, EOM's intact; no scleral edema or icterus noted        ENT:   Normocephalic, without obvious abnormality, atraumatic, Lips, mucosa, and tongue normal        Hematologic / Lymphatic:   No lymphadenopathy       Lungs:   Normal respiratory effort, no accessory muscle use       Cardiovascular:   Regular rate and rhythm       Abdomen:   Soft, nondistended, nontender to palpation       Musculoskeletal:   No obvious swelling, bruising or deformity       Skin:   Skin color and texture normal for patient, no rashes or lesions              Data:            Cesario Zuniga DO  General Surgeon  [facility]  Surgery [address] [address]?  Office: [phone]  Employed by - [facility] [facility] [employer]  Pager: [phone]

## 2023-05-11 NOTE — ANESTHESIA POSTPROCEDURE EVALUATION
Patient: Caitlyn Hutchison    Procedure: Procedure(s):  CHOLECYSTECTOMY, LAPAROSCOPIC       Anesthesia Type:  General    Note:  Disposition: Outpatient   Postop Pain Control: Uneventful            Sign Out: Well controlled pain   PONV: No   Neuro/Psych: Uneventful            Sign Out: Acceptable/Baseline neuro status   Airway/Respiratory: Uneventful            Sign Out: Acceptable/Baseline resp. status   CV/Hemodynamics: Uneventful            Sign Out: Acceptable CV status; No obvious hypovolemia; No obvious fluid overload   Other NRE: NONE   DID A NON-ROUTINE EVENT OCCUR? No           Last vitals:  Vitals Value Taken Time   /66 05/11/23 1136   Temp 97.7  F (36.5  C) 05/11/23 1103   Pulse 79 05/11/23 1138   Resp 16 05/11/23 1130   SpO2 97 % 05/11/23 1138   Vitals shown include unvalidated device data.    Electronically Signed By: Ez Rincon MD  May 11, 2023  11:58 AM

## 2023-05-11 NOTE — ANESTHESIA CARE TRANSFER NOTE
Patient: Caitlyn Hutchison    Procedure: Procedure(s):  CHOLECYSTECTOMY, LAPAROSCOPIC       Diagnosis: Biliary colic [K80.50]  Diagnosis Additional Information: No value filed.    Anesthesia Type:   General     Note:    Oropharynx: oropharynx clear of all foreign objects and spontaneously breathing  Level of Consciousness: drowsy  Oxygen Supplementation: face mask  Level of Supplemental Oxygen (L/min / FiO2): 10  Independent Airway: airway patency satisfactory and stable  Dentition: dentition unchanged  Vital Signs Stable: post-procedure vital signs reviewed and stable  Report to RN Given: handoff report given  Patient transferred to: PACU    Handoff Report: Identifed the Patient, Identified the Reponsible Provider, Reviewed the pertinent medical history, Discussed the surgical course, Reviewed Intra-OP anesthesia mangement and issues during anesthesia, Set expectations for post-procedure period and Allowed opportunity for questions and acknowledgement of understanding      Vitals:  Vitals Value Taken Time   /63 05/11/23 1103   Temp 97.7  F (36.5  C) 05/11/23 1103   Pulse 76 05/11/23 1105   Resp 14 05/11/23 1103   SpO2 100 % 05/11/23 1105   Vitals shown include unvalidated device data.    Electronically Signed By: AMGGY Ugalde CRNA  May 11, 2023  11:08 AM

## 2023-05-11 NOTE — PROGRESS NOTES
Patient was ambulating to restroom when one of the incisions began bleeding, appeared to have hematoma and glue was loosened. Incision wiped with clean gauze, applied pressure, called MD. Instructed by MD to apply pressure dressing.     Abeba Salazar

## 2023-05-11 NOTE — DISCHARGE INSTRUCTIONS
Follow up: It is our practice to have all patients follow up with us 2-3 weeks after their surgery to ensure they are recovering well.  For straightforwardlaparoscopic procedures, this can be done either in clinic as a scheduled follow up appointment, or over the phone.  If you would like a scheduled follow up appointment in clinic, please call us at [phone] to schedule an appointment at your convenience.  If you would prefer to follow up with us by phone please let us know so that we may contact you 2-3 weeks following your procedure.         Diet: Regular diet. Patients can have difficulty with constipation following surgery, due in part to the administration of narcotic medications.  If you are suffering with constipation, you should avoid foods such as hard cheeses or red meat.  Foods high infiber are recommended.       Activity: You should continue to be active at home, including ambulating frequently.  If possible try to limit the amount of time spent in bed.     Restrictions: You have no liftingrestrictions post operatively, but may wish to avoid strenuous physical activity for 1-2 weeks.  You should limit your physical activity if it causes you discomfort; however, this should resolve within 1-2 weeks.   Walking does not count as strenuous physical activity.  You are safe to walk up and down stairs.  Following 2 weeks you may resume all normal physical activity.     Wound / drain care: Your incisions are closed using absorbale sutures.  The skin is sealed with a surgical glue.  Do not peal the glue off.  Please allow the glue to peal off on its own.      It is normal to have a smallrim of red present around the incisions. This should not, however, extend beyond 1/4 inch from the incision.  If your incisions become increasingly tender, red, or draining, please contact us.        Bathing: Youmay shower after 24 hours from surgery.  It is ok to get your incisions wet, but avoid rubbing them.  Avoid  soaking in bath tubs, or swimming in lakes, pools, or streams for 4 weeks following surgery.    If you have any questions or concerns regarding your procedure, please contact Dr. Zuniga, his office number is [phone].     You have received 975 mg of Acetaminophen (Tylenol) at 9:40 am  Please do not take an additional dose of Tylenol until after 3:40 pm     Do not exceed 4,000 mg of acetaminophen during a 24 hour period and keep in mind that acetaminophen can also be found in many over-the-counter cold medications as well as narcotics that may be given for pain.      You received an IV medication today called Toradol. You received this medication at 10:40 am . This is a NSAID. Therefore, do not take any NSAIDS (Ibuprofen products, Advil, Motrin, etc) until 4:40 pm .

## 2023-05-11 NOTE — OP NOTE
[facility]    Operative Note    Pre-operative diagnosis: Biliary colic [K80.50]  Post-operative diagnosis Same as pre-operative diagnosis    Procedure: Procedure(s):  CHOLECYSTECTOMY, LAPAROSCOPIC  Surgeon: Surgeon(s) and Role:     * Cesario Zuniga DO - Primary  Anesthesia: General   Estimated Blood Loss: 5 mL    Drains: None  Specimens:   ID Type Source Tests Collected by Time Destination   1 :  Tissue Gallbladder with Stone(s) SURGICAL PATHOLOGY EXAM Cesario Zuniga DO 5/11/2023 10:38 AM      Findings:     -Gallbladder with stones.    Complications: None.  Implants: * No implants in log *      Indication: 37-year-old female presenting with symptomatic cholelithiasis.  Patient was offered laparoscopic cholecystectomy. The risks and benefits of the procedure were explained detail to the patient. The risks include infection, bleeding, damage to the surrounding structures. Patient verbalized understanding provided consent to undergo the procedure above.      Procedure: Patient was brought back to the operating room and was placed on the operating table in the supine position.  The patient's extremities were padded and positioned in the usual fashion.  The patient then underwent anesthesia sedation and intubation.  The patient's abdomen was prepped and draped in the usual sterile fashion.  Prior to initiating the procedure, a timeout was completed.  All present were in agreement.    1% lidocaine with epinephrine was instilled in Mcneil's point over the left upper quadrant.  An 11 blade was then used to make a small skin incision at Mcneil's point.  A Veress needle was then inserted into the patient's abdomen.  A saline drop test was then completed.  Insufflation was then initiated.  A 5 mm trocar was inserted at the infraumbilical port site with a Visiport.  Once insufflation was completed, a general survey was completed.  There was no injury to the abdominal contents.    A 12 mm port was placed in the epigastric  region.  Two 5 mm ports were placed in the right upper quadrant.  These ports were placed under direct visualization.  The gallbladder was then grasped with gator graspers and retracted cephalad.  On grasping the gallbladder, I could identify patient had multiple gallstones.  The cystic duct and cystic artery were then carefully dissected and isolated with a combination of blunt dissection with the [place] graspers as well as electrocautery.  Once the cystic artery and cystic duct were isolated the critical view was obtained.  An Endo Clip was used to clip across the cystic artery and duct.  Endoscopic nichelle were then used to transect across the cystic duct and cystic artery.  The gallbladder was removed off of the liver bed using electrocautery.  The gallbladder was then removed from the abdomen using an Endo Catch bag through the 12 mm epigastric port site.      Inspection of the liver bed revealed good hemostasis.  The fascia of the 12 mm port site was then closed using an 0 Vicryl suture with a suture passer.  A 3-0 Vicryl suture was used to perform deep dermal sutures at the 12 mm port site.  All surgical sites were then closed with a 4-0 Vicryl suture in a subcuticular fashion.  Surgical glue was used to reinforce all surgical skin incisions.  At the end of the procedure, a final count was completed.  I was told all sharps, sponges, instruments were accounted for.  The patient tolerated the procedure with no complications.  The patient was then extubated and brought back to the PACU in stable condition.      Cesario Zuniga DO  General Surgeon  [facility]  Surgery [address] [address]?  Office: [phone]  Employed by - [facility]  Pager: [phone]

## 2023-05-11 NOTE — ANESTHESIA PREPROCEDURE EVALUATION
"Anesthesia Pre-Procedure Evaluation    Patient: Caitlyn Hutchison   MRN: [id number] : 1985        Procedure : Procedure(s):  CHOLECYSTECTOMY, LAPAROSCOPIC          History reviewed. No pertinent past medical history.   Past Surgical History:   Procedure Laterality Date     APPENDECTOMY       GYN SURGERY        Allergies   Allergen Reactions     Penicillins Anaphylaxis     \"throat closes\"  Pt has tolerated amoxicillin in the past       Latex Itching and Rash      Social History     Tobacco Use     Smoking status: Never     Smokeless tobacco: Never   Vaping Use     Vaping status: Not on file   Substance Use Topics     Alcohol use: Not on file      Wt Readings from Last 1 Encounters:   23 72.1 kg (159 lb)        Anesthesia Evaluation   Pt has had prior anesthetic.     No history of anesthetic complications       ROS/MED HX  ENT/Pulmonary:  - neg pulmonary ROS     Neurologic:  - neg neurologic ROS     Cardiovascular:  - neg cardiovascular ROS     METS/Exercise Tolerance: >4 METS    Hematologic:  - neg hematologic  ROS     Musculoskeletal:  - neg musculoskeletal ROS     GI/Hepatic: Comment: cholelithiasis   (-) GERD   Renal/Genitourinary:  - neg Renal ROS     Endo: Comment: Overweight  BMI 28      Psychiatric/Substance Use:       Infectious Disease:       Malignancy:       Other:            Physical Exam    Airway        Mallampati: II   TM distance: > 3 FB   Neck ROM: full   Mouth opening: > 3 cm    Respiratory Devices and Support         Dental     Comment: Good        Cardiovascular   cardiovascular exam normal          Pulmonary   pulmonary exam normal                OUTSIDE LABS:  CBC:   Lab Results   Component Value Date    WBC 8.2 2023    HGB 13.4 2023    HCT 40.4 2023     2023     BMP:   Lab Results   Component Value Date     2023    POTASSIUM 3.9 2023    CHLORIDE 102 2023    CO2 22 2023    BUN 8.8 2023    CR 0.57 " 05/04/2023    GLC 92 05/04/2023     COAGS: No results found for: PTT, INR, FIBR  POC:   Lab Results   Component Value Date    HCG Negative 05/11/2023     HEPATIC:   Lab Results   Component Value Date    ALBUMIN 4.5 05/04/2023    PROTTOTAL 8.0 05/04/2023    ALT 17 05/04/2023    AST 30 05/04/2023    ALKPHOS 92 05/04/2023    BILITOTAL 0.3 05/04/2023     OTHER:   Lab Results   Component Value Date    GILA 9.5 05/04/2023    LIPASE 36 05/04/2023       Anesthesia Plan    ASA Status:  2   NPO Status:  NPO Appropriate    Anesthesia Type: General.     - Airway: ETT   Induction: Intravenous.   Maintenance: TIVA.        Consents    Anesthesia Plan(s) and associated risks, benefits, and realistic alternatives discussed. Questions answered and patient/representative(s) expressed understanding.     - Discussed: Risks, Benefits and Alternatives for BOTH SEDATION and the PROCEDURE were discussed     - Discussed with:  Patient,  ( by telephone)      - Extended Intubation/Ventilatory Support Discussed: No.      - Patient is DNR/DNI Status: No    Use of blood products discussed: Yes.     - Discussed with: Patient.     - Consented: consented to blood products            Reason for refusal: other.     Postoperative Care    Pain management: Multi-modal analgesia.   PONV prophylaxis: Ondansetron (or other 5HT-3), Dexamethasone or Solumedrol     Comments:    Other Comments: TIVA    Acetaminophen  Ketamine 30 mg IV with robinul  Magnesium  Decadron 10 mg              Ez Rincon MD

## 2023-05-11 NOTE — ANESTHESIA PROCEDURE NOTES
Airway       Patient location during procedure: OR       Procedure Start/Stop Times: 5/11/2023 10:07 AM  Staff -        Anesthesiologist:  Ez Rincon MD       CRNA: Edgar Reeves APRN CRNA       Performed By: CRNA  Consent for Airway        Urgency: elective  Indications and Patient Condition       Indications for airway management: anastasia-procedural       Induction type:intravenous       Mask difficulty assessment: 1 - vent by mask    Final Airway Details       Final airway type: endotracheal airway       Successful airway: ETT - single  Endotracheal Airway Details        ETT size (mm): 7.0       Cuffed: yes       Cuff volume (mL): 8       Successful intubation technique: direct laryngoscopy       DL Blade Type: Day 2       Grade View of Cords: 1       Adjucts: stylet       Position: Right       Measured from: lips       Secured at (cm): 21       Bite block used: None    Post intubation assessment        Placement verified by: capnometry, equal breath sounds and chest rise        Number of attempts at approach: 1       Number of other approaches attempted: 0       Secured with: silk tape       Ease of procedure: easy       Dentition: Intact and Unchanged       Dental guard used and removed. Dental Guard Type: Proguard Red.    Medication(s) Administered   Medication Administration Time: 5/11/2023 10:07 AM

## 2023-05-21 ENCOUNTER — HEALTH MAINTENANCE LETTER (OUTPATIENT)
Age: 38
End: 2023-05-21

## 2024-07-28 ENCOUNTER — HEALTH MAINTENANCE LETTER (OUTPATIENT)
Age: 39
End: 2024-07-28

## 2025-04-04 ENCOUNTER — APPOINTMENT (OUTPATIENT)
Dept: MRI IMAGING | Facility: HOSPITAL | Age: 40
End: 2025-04-04
Attending: STUDENT IN AN ORGANIZED HEALTH CARE EDUCATION/TRAINING PROGRAM
Payer: COMMERCIAL

## 2025-04-04 ENCOUNTER — HOSPITAL ENCOUNTER (EMERGENCY)
Facility: HOSPITAL | Age: 40
Discharge: HOME | End: 2025-04-05
Attending: EMERGENCY MEDICINE | Admitting: EMERGENCY MEDICINE
Payer: COMMERCIAL

## 2025-04-04 ENCOUNTER — APPOINTMENT (OUTPATIENT)
Dept: CT IMAGING | Facility: HOSPITAL | Age: 40
End: 2025-04-04
Attending: STUDENT IN AN ORGANIZED HEALTH CARE EDUCATION/TRAINING PROGRAM
Payer: COMMERCIAL

## 2025-04-04 DIAGNOSIS — R51.9 ACUTE NONINTRACTABLE HEADACHE, UNSPECIFIED HEADACHE TYPE: ICD-10-CM

## 2025-04-04 DIAGNOSIS — M54.2 NECK PAIN ON LEFT SIDE: ICD-10-CM

## 2025-04-04 LAB
ALBUMIN SERPL BCG-MCNC: 4.7 G/DL (ref 3.5–5.2)
ALP SERPL-CCNC: 88 U/L (ref 40–150)
ALT SERPL W P-5'-P-CCNC: 13 U/L (ref 0–50)
ANION GAP SERPL CALCULATED.3IONS-SCNC: 11 MMOL/L (ref 7–15)
APTT PPP: 25 SECONDS (ref 22–38)
AST SERPL W P-5'-P-CCNC: 15 U/L (ref 0–45)
BASOPHILS # BLD AUTO: 0 10E3/UL (ref 0–0.2)
BASOPHILS NFR BLD AUTO: 0 %
BILIRUB SERPL-MCNC: 0.3 MG/DL
BUN SERPL-MCNC: 10 MG/DL (ref 6–20)
CALCIUM SERPL-MCNC: 9.5 MG/DL (ref 8.8–10.4)
CHLORIDE SERPL-SCNC: 103 MMOL/L (ref 98–107)
CREAT SERPL-MCNC: 0.74 MG/DL (ref 0.51–0.95)
EGFRCR SERPLBLD CKD-EPI 2021: >90 ML/MIN/1.73M2
EOSINOPHIL # BLD AUTO: 0.2 10E3/UL (ref 0–0.7)
EOSINOPHIL NFR BLD AUTO: 2 %
ERYTHROCYTE [DISTWIDTH] IN BLOOD BY AUTOMATED COUNT: 12.5 % (ref 10–15)
ERYTHROCYTE [SEDIMENTATION RATE] IN BLOOD BY WESTERGREN METHOD: 15 MM/HR (ref 0–20)
FLUAV RNA SPEC QL NAA+PROBE: NEGATIVE
FLUBV RNA RESP QL NAA+PROBE: NEGATIVE
GLUCOSE SERPL-MCNC: 112 MG/DL (ref 70–99)
HCG SERPL QL: NEGATIVE
HCO3 SERPL-SCNC: 25 MMOL/L (ref 22–29)
HCT VFR BLD AUTO: 37.6 % (ref 35–47)
HGB BLD-MCNC: 12.6 G/DL (ref 11.7–15.7)
HOLD SPECIMEN: NORMAL
IMM GRANULOCYTES # BLD: 0 10E3/UL
IMM GRANULOCYTES NFR BLD: 0 %
INR PPP: 1 (ref 0.85–1.15)
LYMPHOCYTES # BLD AUTO: 2.1 10E3/UL (ref 0.8–5.3)
LYMPHOCYTES NFR BLD AUTO: 23 %
MCH RBC QN AUTO: 30.7 PG (ref 26.5–33)
MCHC RBC AUTO-ENTMCNC: 33.5 G/DL (ref 31.5–36.5)
MCV RBC AUTO: 92 FL (ref 78–100)
MONOCYTES # BLD AUTO: 0.5 10E3/UL (ref 0–1.3)
MONOCYTES NFR BLD AUTO: 6 %
NEUTROPHILS # BLD AUTO: 6.2 10E3/UL (ref 1.6–8.3)
NEUTROPHILS NFR BLD AUTO: 68 %
NRBC # BLD AUTO: 0 10E3/UL
NRBC BLD AUTO-RTO: 0 /100
PLATELET # BLD AUTO: 297 10E3/UL (ref 150–450)
POTASSIUM SERPL-SCNC: 3.9 MMOL/L (ref 3.4–5.3)
PROT SERPL-MCNC: 7.8 G/DL (ref 6.4–8.3)
RBC # BLD AUTO: 4.11 10E6/UL (ref 3.8–5.2)
RSV RNA SPEC NAA+PROBE: NEGATIVE
SARS-COV-2 RNA RESP QL NAA+PROBE: NEGATIVE
SODIUM SERPL-SCNC: 139 MMOL/L (ref 135–145)
TROPONIN T SERPL HS-MCNC: <6 NG/L
WBC # BLD AUTO: 9.1 10E3/UL (ref 4–11)

## 2025-04-04 PROCEDURE — 85730 THROMBOPLASTIN TIME PARTIAL: CPT | Performed by: EMERGENCY MEDICINE

## 2025-04-04 PROCEDURE — 96375 TX/PRO/DX INJ NEW DRUG ADDON: CPT

## 2025-04-04 PROCEDURE — 96374 THER/PROPH/DIAG INJ IV PUSH: CPT

## 2025-04-04 PROCEDURE — 87637 SARSCOV2&INF A&B&RSV AMP PRB: CPT | Performed by: STUDENT IN AN ORGANIZED HEALTH CARE EDUCATION/TRAINING PROGRAM

## 2025-04-04 PROCEDURE — 84703 CHORIONIC GONADOTROPIN ASSAY: CPT | Performed by: STUDENT IN AN ORGANIZED HEALTH CARE EDUCATION/TRAINING PROGRAM

## 2025-04-04 PROCEDURE — 85004 AUTOMATED DIFF WBC COUNT: CPT | Performed by: STUDENT IN AN ORGANIZED HEALTH CARE EDUCATION/TRAINING PROGRAM

## 2025-04-04 PROCEDURE — 250N000013 HC RX MED GY IP 250 OP 250 PS 637: Performed by: STUDENT IN AN ORGANIZED HEALTH CARE EDUCATION/TRAINING PROGRAM

## 2025-04-04 PROCEDURE — 85652 RBC SED RATE AUTOMATED: CPT | Performed by: EMERGENCY MEDICINE

## 2025-04-04 PROCEDURE — 85014 HEMATOCRIT: CPT | Performed by: STUDENT IN AN ORGANIZED HEALTH CARE EDUCATION/TRAINING PROGRAM

## 2025-04-04 PROCEDURE — 70496 CT ANGIOGRAPHY HEAD: CPT

## 2025-04-04 PROCEDURE — 85610 PROTHROMBIN TIME: CPT | Performed by: EMERGENCY MEDICINE

## 2025-04-04 PROCEDURE — A9585 GADOBUTROL INJECTION: HCPCS | Performed by: STUDENT IN AN ORGANIZED HEALTH CARE EDUCATION/TRAINING PROGRAM

## 2025-04-04 PROCEDURE — 99285 EMERGENCY DEPT VISIT HI MDM: CPT | Mod: 25

## 2025-04-04 PROCEDURE — 250N000011 HC RX IP 250 OP 636: Mod: JZ | Performed by: STUDENT IN AN ORGANIZED HEALTH CARE EDUCATION/TRAINING PROGRAM

## 2025-04-04 PROCEDURE — 36415 COLL VENOUS BLD VENIPUNCTURE: CPT | Performed by: EMERGENCY MEDICINE

## 2025-04-04 PROCEDURE — 80053 COMPREHEN METABOLIC PANEL: CPT | Performed by: STUDENT IN AN ORGANIZED HEALTH CARE EDUCATION/TRAINING PROGRAM

## 2025-04-04 PROCEDURE — 82310 ASSAY OF CALCIUM: CPT | Performed by: STUDENT IN AN ORGANIZED HEALTH CARE EDUCATION/TRAINING PROGRAM

## 2025-04-04 PROCEDURE — 255N000002 HC RX 255 OP 636: Performed by: STUDENT IN AN ORGANIZED HEALTH CARE EDUCATION/TRAINING PROGRAM

## 2025-04-04 PROCEDURE — 250N000011 HC RX IP 250 OP 636: Performed by: STUDENT IN AN ORGANIZED HEALTH CARE EDUCATION/TRAINING PROGRAM

## 2025-04-04 PROCEDURE — 70553 MRI BRAIN STEM W/O & W/DYE: CPT

## 2025-04-04 PROCEDURE — 36415 COLL VENOUS BLD VENIPUNCTURE: CPT | Performed by: STUDENT IN AN ORGANIZED HEALTH CARE EDUCATION/TRAINING PROGRAM

## 2025-04-04 PROCEDURE — 84484 ASSAY OF TROPONIN QUANT: CPT | Performed by: EMERGENCY MEDICINE

## 2025-04-04 PROCEDURE — 82040 ASSAY OF SERUM ALBUMIN: CPT | Performed by: STUDENT IN AN ORGANIZED HEALTH CARE EDUCATION/TRAINING PROGRAM

## 2025-04-04 PROCEDURE — 93005 ELECTROCARDIOGRAM TRACING: CPT | Performed by: EMERGENCY MEDICINE

## 2025-04-04 RX ORDER — ONDANSETRON 2 MG/ML
4 INJECTION INTRAMUSCULAR; INTRAVENOUS ONCE
Status: COMPLETED | OUTPATIENT
Start: 2025-04-04 | End: 2025-04-04

## 2025-04-04 RX ORDER — DIPHENHYDRAMINE HYDROCHLORIDE 50 MG/ML
25 INJECTION, SOLUTION INTRAMUSCULAR; INTRAVENOUS ONCE
Status: COMPLETED | OUTPATIENT
Start: 2025-04-04 | End: 2025-04-04

## 2025-04-04 RX ORDER — IOPAMIDOL 755 MG/ML
67 INJECTION, SOLUTION INTRAVASCULAR ONCE
Status: COMPLETED | OUTPATIENT
Start: 2025-04-04 | End: 2025-04-04

## 2025-04-04 RX ORDER — METOCLOPRAMIDE HYDROCHLORIDE 5 MG/ML
10 INJECTION INTRAMUSCULAR; INTRAVENOUS ONCE
Status: COMPLETED | OUTPATIENT
Start: 2025-04-04 | End: 2025-04-04

## 2025-04-04 RX ORDER — GADOBUTROL 604.72 MG/ML
7 INJECTION INTRAVENOUS ONCE
Status: COMPLETED | OUTPATIENT
Start: 2025-04-04 | End: 2025-04-04

## 2025-04-04 RX ORDER — MECLIZINE HYDROCHLORIDE 25 MG/1
25 TABLET ORAL ONCE
Status: COMPLETED | OUTPATIENT
Start: 2025-04-04 | End: 2025-04-04

## 2025-04-04 RX ORDER — KETOROLAC TROMETHAMINE 15 MG/ML
15 INJECTION, SOLUTION INTRAMUSCULAR; INTRAVENOUS ONCE
Status: COMPLETED | OUTPATIENT
Start: 2025-04-04 | End: 2025-04-04

## 2025-04-04 RX ADMIN — IOPAMIDOL 67 ML: 755 INJECTION, SOLUTION INTRAVENOUS at 20:37

## 2025-04-04 RX ADMIN — GADOBUTROL 7 ML: 604.72 INJECTION INTRAVENOUS at 22:45

## 2025-04-04 RX ADMIN — MECLIZINE HYDROCHLORIDE 25 MG: 25 TABLET ORAL at 21:57

## 2025-04-04 RX ADMIN — ONDANSETRON 4 MG: 2 INJECTION, SOLUTION INTRAMUSCULAR; INTRAVENOUS at 21:12

## 2025-04-04 RX ADMIN — KETOROLAC TROMETHAMINE 15 MG: 15 INJECTION, SOLUTION INTRAMUSCULAR; INTRAVENOUS at 21:01

## 2025-04-04 ASSESSMENT — ACTIVITIES OF DAILY LIVING (ADL)
ADLS_ACUITY_SCORE: 41

## 2025-04-04 ASSESSMENT — COLUMBIA-SUICIDE SEVERITY RATING SCALE - C-SSRS
6. HAVE YOU EVER DONE ANYTHING, STARTED TO DO ANYTHING, OR PREPARED TO DO ANYTHING TO END YOUR LIFE?: NO
1. IN THE PAST MONTH, HAVE YOU WISHED YOU WERE DEAD OR WISHED YOU COULD GO TO SLEEP AND NOT WAKE UP?: NO
2. HAVE YOU ACTUALLY HAD ANY THOUGHTS OF KILLING YOURSELF IN THE PAST MONTH?: NO

## 2025-04-04 NOTE — ED TRIAGE NOTES
Patient arrives by private car for evaluation of headache and left sided neck pain.  Patient states neck pain started about 1 week ago, today she developed a headache on the same side at approximately 1500 today.  Has not taken anything for the pain.  Vomiting x 3.

## 2025-04-05 VITALS
DIASTOLIC BLOOD PRESSURE: 64 MMHG | TEMPERATURE: 98.5 F | HEIGHT: 63 IN | BODY MASS INDEX: 28 KG/M2 | WEIGHT: 158 LBS | RESPIRATION RATE: 22 BRPM | HEART RATE: 70 BPM | OXYGEN SATURATION: 99 % | SYSTOLIC BLOOD PRESSURE: 109 MMHG

## 2025-04-05 RX ORDER — METOCLOPRAMIDE 10 MG/1
10 TABLET ORAL
Qty: 15 TABLET | Refills: 0 | Status: SHIPPED | OUTPATIENT
Start: 2025-04-05 | End: 2025-04-09

## 2025-04-05 NOTE — ED PROVIDER NOTES
"Emergency Department Midlevel Supervisory Note     I had a face to face encounter with this patient seen by the Advanced Practice Provider (LEIDA). I personally made/approved the management plan and take responsibility for the patient management. I personally saw patient and performed a substantive portion of the visit including all aspects of the medical decision making.     ED Course:  8:15 PM Debo Hawk PA-C staffed patient with me. I agree with their assessment and plan of management, and I will see the patient.  8:20 PM I met with the patient to introduce myself, gather additional history, perform my initial exam, and discuss the plan.     Brief HPI:     Caitlyn Hutchison is a 39 year old female who presents for evaluation of headache. Patient states for the past week she has had left-sided neck pain and felt as though she \"slept wrong\".  Today around 3 PM she developed sudden onset left-sided headache as well as room spinning dizziness and vomiting     I, Juan Ramon Buitrago, am serving as a scribe to document services personally performed by Alicia Perez MD, based on my observations and the provider's statements to me.   I, Alicia Perez MD, attest that Juan Ramon Buitrago was acting in a scribe capacity, has observed my performance of the services and has documented them in accordance with my direction.    Brief Physical Exam: /66   Pulse 74   Temp 98.5  F (36.9  C) (Oral)   Resp 25   Ht 1.6 m (5' 3\")   Wt 71.7 kg (158 lb)   LMP 03/24/2025   SpO2 98%   BMI 27.99 kg/m    Constitutional:  Alert, in no acute distress  EYES: Conjunctivae clear  HENT:  Atraumatic  Respiratory:  Respirations even, unlabored, in no acute respiratory distress  Cardiovascular:  Regular rate and rhythm, good peripheral perfusion  GI: Soft, non-distended, non-tender  Musculoskeletal:  Moves all 4 extremities equally, grossly symmetrical strength  Integument: Warm & dry. No appreciable rash, erythema.  Neurologic:  " Alert & oriented, speech clear and fluent, no focal deficits noted  Psych: Normal mood and affect    EKG #1  Sinus Rhythm normal anterior progression normal axis    Time:[id number]    Ventricular rate 79 bmp  Axis normal  AZ interval 132 ms  QRS duration 90 ms  QT//449 ms    Compared to previous EKG on no previous available for comparison  MDM:  39-year-old  female with no previous past medical history presents complaining of left-sided neck pain and headache.  Left-sided neck pain has been for a week.  She thought she slept wrong.  Today at 3:00 had the sudden onset of headache.  On arrival she states she describes some numbness on the left side.  During my evaluation her gait is steady.  She walks with torticollis like on the left side of the neck.  She has intact movement of the upper and lower extremities.  There is no ataxia.    Considered subarachnoid hemorrhage given the sudden onset of headache.  CT done within 6 hours of sudden onset of headache therefore second hemorrhage is unlikely.    Clinical evidence of infection.    Etiologies considered were thrombotic sinus event.  She has no risk factor and is well-appearing overall.    Reevaluation after medication she feels much better.    Patient signed out to Dr. Anguiano [id number] pending MRI and reevaluation.       1. Acute nonintractable headache, unspecified headache type    2. Neck pain on left side        Consults:  Debo spoke with the stroke team.  I agree with plan.    Labs and Imaging:  Results for orders placed or performed during the hospital encounter of 04/04/25   CTA Head Neck with Contrast    Impression    IMPRESSION:   HEAD CT:  1.  Normal head CT.    HEAD CTA:   1.  Normal CTA [place] [facility] [place].    NECK CTA:  1.  Normal neck CTA.    Findings were discussed with MADELYN Bullock at 18:43 hours CDT.    Comprehensive metabolic panel   Result Value Ref Range    Sodium 139 135 - 145 mmol/L    Potassium 3.9 3.4 - 5.3 mmol/L    Carbon Dioxide (CO2)  25 22 - 29 mmol/L    Anion Gap 11 7 - 15 mmol/L    Urea Nitrogen 10.0 6.0 - 20.0 mg/dL    Creatinine 0.74 0.51 - 0.95 mg/dL    GFR Estimate >90 >60 mL/min/1.73m2    Calcium 9.5 8.8 - 10.4 mg/dL    Chloride 103 98 - 107 mmol/L    Glucose 112 (H) 70 - 99 mg/dL    Alkaline Phosphatase 88 40 - 150 U/L    AST 15 0 - 45 U/L    ALT 13 0 - 50 U/L    Protein Total 7.8 6.4 - 8.3 g/dL    Albumin 4.7 3.5 - 5.2 g/dL    Bilirubin Total 0.3 <=1.2 mg/dL   HCG QUALitative pregnancy (blood)   Result Value Ref Range    hCG Serum Qualitative Negative Negative   Influenza A/B, RSV and SARS-CoV2 PCR (COVID-19) Nose    Specimen: Nose; Swab   Result Value Ref Range    Influenza A PCR Negative Negative    Influenza B PCR Negative Negative    RSV PCR Negative Negative    SARS CoV2 PCR Negative Negative   CBC with platelets and differential   Result Value Ref Range    WBC Count 9.1 4.0 - 11.0 10e3/uL    RBC Count 4.11 3.80 - 5.20 10e6/uL    Hemoglobin 12.6 11.7 - 15.7 g/dL    Hematocrit 37.6 35.0 - 47.0 %    MCV 92 78 - 100 fL    MCH 30.7 26.5 - 33.0 pg    MCHC 33.5 31.5 - 36.5 g/dL    RDW 12.5 10.0 - 15.0 %    Platelet Count 297 150 - 450 10e3/uL    % Neutrophils 68 %    % Lymphocytes 23 %    % Monocytes 6 %    % Eosinophils 2 %    % Basophils 0 %    % Immature Granulocytes 0 %    NRBCs per 100 WBC 0 <1 /100    Absolute Neutrophils 6.2 1.6 - 8.3 10e3/uL    Absolute Lymphocytes 2.1 0.8 - 5.3 10e3/uL    Absolute Monocytes 0.5 0.0 - 1.3 10e3/uL    Absolute Eosinophils 0.2 0.0 - 0.7 10e3/uL    Absolute Basophils 0.0 0.0 - 0.2 10e3/uL    Absolute Immature Granulocytes 0.0 <=0.4 10e3/uL    Absolute NRBCs 0.0 10e3/uL   Extra Blue Top Tube   Result Value Ref Range    Hold Specimen JIC    Result Value Ref Range    INR 1.00 0.85 - 1.15   Partial thromboplastin time   Result Value Ref Range    aPTT 25 22 - 38 Seconds   Result Value Ref Range    Troponin T, High Sensitivity <6 <=14 ng/L   Erythrocyte sedimentation rate auto   Result Value Ref Range     Erythrocyte Sedimentation Rate 15 0 - 20 mm/hr       I have reviewed the relevant laboratory studies above.    I independently interpreted the following imaging study(s):   CT head independently interpreted by me reveals no intracranial hemorrhage formal read by radiologist.    EKG: I reviewed and independently interpreted the patient's EKG, with comments made as listed below. Please see scanned EKG for full report.       Procedures:  I was present for the key portions of procedures documented in LEIDA/midlevel note, see midlevel note for further details.    Alicia Perez MD  [facility] EMERGENCY DEPARTMENT  [address] [place] [id number]  [phone]       Alicia Perez MD  04/04/25 0216

## 2025-04-05 NOTE — DISCHARGE INSTRUCTIONS
You were seen in the emergency department today for severe headache and neck pain.  Your CT scans and MRI of your head are normal, no signs of bleeding in your brain or neck or large brain mass. Your blood work all looks good as well.    I prescribed a medicine called Reglan that is helpful for dizziness and nausea with headaches.  You can take this up to every 6 hours as needed for symptoms.  You may take Ibuprofen up to 400 mg by mouth every 4-6 hours as needed for pain. Do not exceed 2400 mg/day.  You may take Tylenol 325-1000 mg by mouth every 4-6 hours as needed for pain. Do not exceed 1000mg (1g) in 4 hours or 4 g/day from all sources.  You may combine Tylenol and Ibuprofen- up to 400 mg of ibuprofen and 1000 mg of Tylenol at the same time, up to 3 times a day for the pain    You can also try Excedrin which is an over-the-counter migraine medication that includes Tylenol, ibuprofen, and caffeine.    Be sure you are staying hydrated, getting enough sleep, and limiting stress as these can all contribute to migraines  Follow-up with neurology in clinic regarding your headaches  I have also given you a primary care referral as is important for you to have a primary care provider for continued management of your health   Return to the emergency department for any sudden very severe headache or new vision loss, one sided weakness, or slurred speech

## 2025-04-05 NOTE — ED NOTES
Attempted to sit patient up for bedside swallow. When sitting up, headache became much worse as well as dizziness (room spinning sensation). Provider notified. Will attempt bedside swallow at later time.

## 2025-04-05 NOTE — ED PROVIDER NOTES
"Emergency Department Encounter   NAME: Caitlyn Hutchison ; AGE: 39 year old female ; YOB: 1985 ; MRN: [id number] ; PCP: No Ref-Primary, Physician   ED PROVIDER: Debo Hawk PA-C    Evaluation Date & Time:   No admission date for patient encounter.    CHIEF COMPLAINT:  Headache        Impression and Plan   FINAL IMPRESSION:    ICD-10-CM    1. Acute nonintractable headache, unspecified headache type  R51.9       2. Neck pain on left side  M54.2           ED Course and Medical Decision Making  Caitlyn is a 39-year-old female with no significant PMH presenting to the emergency department for evaluation of headache. Patient states for the past week she has had left-sided neck pain and felt as though she \"slept wrong\". She endorses a hard time turning her head to the left side due to neck tightness.  Today around 3 PM she developed sudden onset left-sided headache as well as room spinning dizziness, nausea, and 3 episodes of vomiting.  She states between 3 PM and 430 she also developed left-sided facial and left arm numbness.  No weakness.  No slurred speech or facial asymmetry.  Patient endorses history of \"headaches when she has had colds but nothing like this\", stating her headache is very severe.  She did not take any meds prior to arrival.  No recent head trauma.  No blood thinners.    Vitals reviewed and unremarkable. On exam she is in no acute distress.   Differential diagnosis/emergent conditions considered and evaluated for includes but not limited to COVID, influenza, tension headache, migraine headache, vertebral artery dissection, intracranial mass, aneurysm, intracranial hemorrhage. A language line  was utilized to obtain history and to conduct physical exam. She is alert and responding to questions appropriately.  Patient is ambulatory in the department and does appear steady with walking. She is holding a water in her left hand. She has 5/5 strength bilaterally " in the upper and lower extremities. Her neurologic exam is intact aside from diminished sensation to light touch in the left side face and left arm.  Radial pulses 2+ bilaterally.  Heart rate regular.  Lung sounds clear.      Given her story of 1 week of neck pain with sudden onset severe headache, dizziness, and vomiting with new left-sided neurologic deficits her story is concerning for vertebral artery dissection. I have ordered a CT/CTA of the head to further evaluate.  Additionally, given patient's onset of new room spinning dizziness and one-sided paresthesias started about 3 hours prior to arrival she is within the timeframe for potential administration of TNK if her symptoms are due to stroke.  Her NIHSS is only 1, however, tier 1 stroke code was called given her sxs started about 4 hours ago.    Blood work is all unremarkable today.  COVID and influenza swabs negative.  CT/CTA head and neck is unremarkable.  I spoke with stroke neurology regarding the patient's case.  They are recommending an MRI of the brain with and without contrast, but ultimately, given her story and negative CT/CTA they suspect likely migraine headache causing her symptoms.  Patient was given a migraine cocktail here in the ER of Zofran, Toradol, and IV fluids.  She endorses significant improvement in almost complete resolution in her symptoms following.  MRI brain came back unremarkable.  I do suspect patient most likely was suffering from severe migraine headache.  I encouraged adequate hydration, rest, and limiting stress to prevent migraine headaches in the future.  I have given patient a prescription for Reglan for home and encouraged use of Tylenol, ibuprofen, and Excedrin as needed for headaches in the future.  I placed a neurology referral for the patient as well as a primary care referral.  Given almost complete resolution of patient's symptoms and negative imaging and blood work today I think patient is ready for discharge  home.  Patient was educated on concerning symptoms that warrant return to the ER and is understanding and agreeable to this plan.            Medical Decision Making  I obtained history from Family Member/Significant Other  I discussed the care with another health care provider: Dr. Alicia Key. I prescribed additional prescription strength medication(s) as charted. See documentation for any additional details.    MIPS (CTPE, Dental pain, [place], Sinusitis, Asthma/COPD, Head Trauma): Not Applicable    SEPSIS: The patient has stroke symptoms:         ED Stroke specific documentation           NIHSS PDF     Patient last known well time: 3:00 PM 4/4/2025  ED Provider first to bedside at: 7:15 PM   CT Results received at: 1843    Thrombolytics:   Not given due to:   - NIHSS of of only 1 , stroke neurology does not recommend administration of TNK    If treating with thrombolytics: Ensure SBP<180 and DBP<105 prior to treatment with thrombolytics.  Administering thrombolytics after treatment with IV labetalol, hydralazine, or nicardipine is reasonable once BP control is established.    Endovascular Retrieval:  Not initiated due to absence of proximal vessel occlusion    National Institutes of Health Stroke Scale (Baseline)  Time Performed: 8:10 PM     Score    Level of consciousness: (0)   Alert, keenly responsive    LOC questions: (0)   Answers both questions correctly    LOC commands: (0)   Performs both tasks correctly    Best gaze: (0)   Normal    Visual: (0)   No visual loss    Facial palsy: (0)   Normal symmetrical movements    Motor arm (left): (0)   No drift    Motor arm (right): (0)   No drift    Motor leg (left): (0)   No drift    Motor leg (right): (0)   No drift    Limb ataxia: (0)   Absent    Sensory: (1)   Mild to moderate sensory loss    Best language: (0)   Normal- no aphasia    Dysarthria: (0)   Normal    Extinction and inattention: (0)   No abnormality        Total Score:  1        Stroke  Mimics were considered (including migraine headache, seizure disorder, hypoglycemia (or hyperglycemia), head or spinal trauma, CNS infection, Toxin ingestion and shock state (e.g. sepsis) .    I considered escalation of care and admitting the patient but ultimately did not given reassuring exam and workup.        ED COURSE:  7:16 PM I met and introduced myself to the patient. I gathered initial history and performed my physical exam. We discussed plan for initial workup.   8:18 PM I have staffed the patient with Dr. Alicia Perez, ED MD, who has evaluated the patient and agrees with all aspects of today's care  8:20 PM spoke to Dr. Liz from stroke neuro   8:40 PM I spoke with [facility], CT/CTA negative  8:42 PM I spoke with Dr. Liz from stroke neuro, de-escalate stroke code.  Will obtain MRI brain.  9:11 PM Rechecked and updated patient.  12:28 PM I rechecked the patient and discussed results, discharge, follow up, and reasons to return to the ED.     At the conclusion of the encounter I discussed the results of all the tests and the disposition. The questions were answered. The patient or family acknowledged understanding and was agreeable with the care plan.          MEDICATIONS GIVEN IN THE EMERGENCY DEPARTMENT:  Medications   sodium chloride 0.9 % bag for CT scan flush (has no administration in time range)   metoclopramide (REGLAN) injection 10 mg (10 mg Intravenous Not Given 4/4/25 2102)   diphenhydrAMINE (BENADRYL) injection 25 mg (25 mg Intravenous Not Given 4/4/25 2101)   iopamidol (ISOVUE-370) solution 67 mL (67 mLs Intravenous $Given 4/4/25 2037)   ketorolac (TORADOL) injection 15 mg (15 mg Intravenous $Given 4/4/25 2101)   ondansetron (ZOFRAN) injection 4 mg (4 mg Intravenous $Given 4/4/25 2112)   meclizine (ANTIVERT) tablet 25 mg (25 mg Oral $Given 4/4/25 2157)   gadobutrol (GADAVIST) injection 7 mL (7 mLs Intravenous $Given 4/4/25 2245)         NEW PRESCRIPTIONS STARTED AT TODAY'S ED VISIT:  New  "Prescriptions    No medications on file         HPI     Caitlyn Hutchison is a 39 year old female with no pertinent history who presents to the ED by car for evaluation of headache.     Patient states at 3:00 PM today she got a headache. This headache is unlike any headache she has had before. Around 4:30 PM today she started to get left sided arm and face numbness. She has not taken any medications for her symptoms. She has left sided neck pain, this started about a week ago, she thought it was from sleeping on her neck wrong. She can not turn her head to the left without pain. Patient had 3 episodes of vomiting today.     Patient denies fevers, body aches, and congestion. There were no other complaints/concerns at this time.         Physical Exam     First Vitals:  Patient Vitals for the past 24 hrs:   BP Temp Temp src Pulse Resp SpO2 Height Weight   04/04/25 2200 115/69 -- -- 68 22 98 % -- --   04/04/25 2145 113/66 -- -- 71 23 98 % -- --   04/04/25 2134 109/66 -- -- 74 25 98 % -- --   04/04/25 2126 -- -- -- -- -- -- 1.6 m (5' 3\") --   04/04/25 2119 125/67 -- -- 69 21 99 % -- --   04/04/25 2104 116/66 -- -- 82 22 98 % -- --   04/04/25 2051 132/77 -- -- 73 23 97 % -- --   04/04/25 2036 129/70 -- -- 72 24 98 % -- --   04/04/25 1842 134/86 98.5  F (36.9  C) Oral 74 18 99 % -- 71.7 kg (158 lb)         PHYSICAL EXAM    General Appearance:  Alert, cooperative, no distress, appears stated age  HENT: Normocephalic without obvious deformity, atraumatic. Mucous membranes moist   Eyes: Conjunctiva clear, Lids normal. No discharge.   Respiratory: No distress. Lungs clear to ausculation bilaterally. No wheezes, rhonchi or stridor  Cardiovascular: Regular rate and rhythm, no murmur. Normal cap refill. No peripheral edema  GI: Abdomen soft, nontender  Musculoskeletal: Moving all extremities. No gross deformities  Integument: Warm, dry, no rashes or lesions  Neurologic: Alert and orientated x3. No focal deficits. GCS " 15. HUBER. Cranial nerves III through XII intact.  No facial asymmetry. Slightly diminished sensation to light touch in the left side face and left hand when compared to the right side. No diminished sensation in the left lower extremity. Finger/nose/finger intact.  Negative pronator drift.  Intact straight leg raise.  5 out of 5 strength with , flexion extension at elbow joints, flexion at shoulder and hip joint against resistance.  Dorsiflexion and plantarflexion are intact.  Answering questions appropriately with normal speech.  No aphasia or dysarthria.  Following commands.  Intact visual fields.    Psych: Normal mood and affect        Results     LAB:  All pertinent labs reviewed and interpreted  Labs Ordered and Resulted from Time of ED Arrival to Time of ED Departure   COMPREHENSIVE METABOLIC PANEL - Abnormal       Result Value    Sodium 139      Potassium 3.9      Carbon Dioxide (CO2) 25      Anion Gap 11      Urea Nitrogen 10.0      Creatinine 0.74      GFR Estimate >90      Calcium 9.5      Chloride 103      Glucose 112 (*)     Alkaline Phosphatase 88      AST 15      ALT 13      Protein Total 7.8      Albumin 4.7      Bilirubin Total 0.3     HCG QUALITATIVE PREGNANCY - Normal    hCG Serum Qualitative Negative     INFLUENZA A/B, RSV AND SARS-COV2 PCR - Normal    Influenza A PCR Negative      Influenza B PCR Negative      RSV PCR Negative      SARS CoV2 PCR Negative     INR - Normal    INR 1.00     PARTIAL THROMBOPLASTIN TIME - Normal    aPTT 25     TROPONIN T, HIGH SENSITIVITY - Normal    Troponin T, High Sensitivity <6     ERYTHROCYTE SEDIMENTATION RATE AUTO - Normal    Erythrocyte Sedimentation Rate 15     CBC WITH PLATELETS AND DIFFERENTIAL    WBC Count 9.1      RBC Count 4.11      Hemoglobin 12.6      Hematocrit 37.6      MCV 92      MCH 30.7      MCHC 33.5      RDW 12.5      Platelet Count 297      % Neutrophils 68      % Lymphocytes 23      % Monocytes 6      % Eosinophils 2      % Basophils 0       % Immature Granulocytes 0      NRBCs per 100 WBC 0      Absolute Neutrophils 6.2      Absolute Lymphocytes 2.1      Absolute Monocytes 0.5      Absolute Eosinophils 0.2      Absolute Basophils 0.0      Absolute Immature Granulocytes 0.0      Absolute NRBCs 0.0     GLUCOSE MONITOR NURSING POCT       RADIOLOGY:  MR Brain w/o & w Contrast   Final Result   IMPRESSION:   1.  Normal head MRI.      CTA Head Neck with Contrast   Final Result   IMPRESSION:    HEAD CT:   1.  Normal head CT.      HEAD CTA:    1.  Normal CTA [place] [place].      NECK CTA:   1.  Normal neck CTA.      Findings were discussed with MADELYN Bullock at 18:43 hours CDT.             ECG:    Performed at: 4/4/2025    Impression: sinus rhythm     Rate: 79  Rhythm: sinus rhythm   Axis: 60  OH Interval: 132  QRS Interval: 90  QTc Interval: 392/449  ST Changes: No acute changes  Comparison: No previous ECG's    EKG results reviewed and interpreted by Dr. Jorje Lind, Pato WALTERS MD, am serving as a scribe to document services personally performed by Debo Hawk PA-C, based on my observation and the provider's statements to me. IDebo PA-C attest that Pato Rico is acting in a scribe capacity, has observed my performance of the services and has documented them in accordance with my direction.       Debo Hawk PA-C   Emergency Medicine   [facility]       Debo Hawk PA-C  04/05/25 0222

## 2025-04-05 NOTE — ED PROVIDER NOTES
"EMERGENCY DEPARTMENT SIGN OUT NOTE        BRIEF HISTORY  Patient was signed out to me by Dr. Alicia Perez at 10:34 PM      Caitlyn Hutchison is a 39 year old female who presented for a headache. Left-sided neck pain as if she \"slept wrong\" ongoing for the past week. Sudden onset of left-sided headache today ~1500 with room spinning dizziness and vomiting.       LAB  Pertinent labs results reviewed in Epic.    RADIOLOGY    Pertinent imaging reviewed. Please see official radiology report.    ED COURSE  10:34 PM The patient was signed out to me by Dr. Alicia Perez.   12:20 AM MRI negative. PA updated patient.     MEDICAL DECISION MAKING  39 year old female presented for evaluation of a headache, dizziness, and vomiting.  She currently has no focal neurodeficits and is feeling improved after medications given here.  Plan at time of signout is to follow-up on results of MRI with PA, who is taking care of the patient primarily.    MRI showed no acute neurovascular process to explain symptoms.  PA updated patient with these results and she felt reassured.  She has been able to tolerate p.o. and ambulate at baseline.  Suspect her symptoms are related to atypical migraine.  She was comfortable with plan for discharge and follow-up on an outpatient basis with neurology and PCP.    FINAL IMPRESSION    1. Acute nonintractable headache, unspecified headache type    2. Neck pain on left side           Noelle Anguiano MD  Emergency Medicine  [facility]       Noelle Anguiano MD  04/06/25 0739    "

## 2025-04-05 NOTE — CONSULTS
[facility]    Stroke Telephone Note    I was called by Alicia Perez on 04/04/25 regarding patient Caitlyn Hutchison. The patient is a 39 year old female no pertinent past medical history presented because of neck pain for 1 week, headache that started around 3:00 in the afternoon followed by paresthesia over the left face and arm.    Vitals  BP: 134/86   Pulse: 74   Resp: 18   Temp: 98.5  F (36.9  C)   Weight: 71.7 kg (158 lb)    Stroke Code Data (for stroke code without tele)  Stroke code activated 04/04/25 2018   Stroke provider first response 04/04/25 2019   Last known normal 04/04/25  1500      Time of discovery (or onset of symptoms) 04/04/25  1500   Head CT read by Stroke Neuro Provider 04/04/25 2037   Was stroke code de-escalated? Yes  04/04/25 2042     Imaging Findings  CT head: no acute finding, no hemorrhage  CTA head/neck: no dissection, no LVO    Intravenous Thrombolysis  Not given due to:   - unclear or unfavorable risk-benefit profile for extended window thrombolysis beyond the conventional 4.5 hour time window    Endovascular Treatment  Not initiated due to absence of proximal vessel occlusion    Impression  Sudden onset of headache, left side paresthesia. Neck pain for 1 week. No hemorrhage, no dissection on CT CTA. Differential includes complex migraine among others.     Recommendations   Stroke code de escalated   MRI brain wwo  Symptomatic treatment of her headache   If MRI negative, no further stroke work up needed. Outpatient follow up with general neurology for headache and neck pain.    My recommendations are based on the information provided over the phone by Caitlyn Hutchison's in-person providers. They are not intended to replace the clinical judgment of her in-person providers. I was not requested to personally see or examine the patient at this time.     The Stroke Staff is Dr. Finch.    Jake Liz MD  Vascular Neurology  "Fellow    To page me or covering stroke neurology team member, click here: [facility]COM  Choose \"On Call\" tab at top, then select \"NEUROLOGY/ALL SITES\" from middle drop-down box, press Enter, then look for \"stroke\" or \"telestroke\" for your site.    "

## 2025-04-09 LAB
ATRIAL RATE - MUSE: 79 BPM
DIASTOLIC BLOOD PRESSURE - MUSE: NORMAL MMHG
INTERPRETATION ECG - MUSE: NORMAL
P AXIS - MUSE: 55 DEGREES
PR INTERVAL - MUSE: 132 MS
QRS DURATION - MUSE: 90 MS
QT - MUSE: 392 MS
QTC - MUSE: 449 MS
R AXIS - MUSE: 60 DEGREES
SYSTOLIC BLOOD PRESSURE - MUSE: NORMAL MMHG
T AXIS - MUSE: 60 DEGREES
VENTRICULAR RATE- MUSE: 79 BPM

## 2025-04-15 ENCOUNTER — OFFICE VISIT (OUTPATIENT)
Dept: FAMILY MEDICINE | Facility: CLINIC | Age: 40
End: 2025-04-15
Attending: STUDENT IN AN ORGANIZED HEALTH CARE EDUCATION/TRAINING PROGRAM
Payer: COMMERCIAL

## 2025-04-15 VITALS
RESPIRATION RATE: 16 BRPM | OXYGEN SATURATION: 99 % | HEART RATE: 68 BPM | SYSTOLIC BLOOD PRESSURE: 110 MMHG | TEMPERATURE: 98.4 F | HEIGHT: 58 IN | BODY MASS INDEX: 32.82 KG/M2 | WEIGHT: 156.38 LBS | DIASTOLIC BLOOD PRESSURE: 69 MMHG

## 2025-04-15 DIAGNOSIS — M54.2 NECK PAIN ON LEFT SIDE: ICD-10-CM

## 2025-04-15 DIAGNOSIS — R51.9 ACUTE NONINTRACTABLE HEADACHE, UNSPECIFIED HEADACHE TYPE: Primary | ICD-10-CM

## 2025-04-15 PROCEDURE — 99203 OFFICE O/P NEW LOW 30 MIN: CPT | Performed by: PHYSICIAN ASSISTANT

## 2025-04-15 PROCEDURE — 3078F DIAST BP <80 MM HG: CPT | Performed by: PHYSICIAN ASSISTANT

## 2025-04-15 PROCEDURE — T1013 SIGN LANG/ORAL INTERPRETER: HCPCS | Mod: U4

## 2025-04-15 PROCEDURE — 3074F SYST BP LT 130 MM HG: CPT | Performed by: PHYSICIAN ASSISTANT

## 2025-04-15 RX ORDER — ASPIRIN 81 MG/1
81 TABLET ORAL DAILY
Qty: 90 TABLET | Refills: 3 | Status: SHIPPED | OUTPATIENT
Start: 2025-04-15

## 2025-04-15 RX ORDER — SUMATRIPTAN SUCCINATE 25 MG/1
25-50 TABLET ORAL
Qty: 20 TABLET | Refills: 0 | Status: SHIPPED | OUTPATIENT
Start: 2025-04-15

## 2025-04-15 NOTE — PROGRESS NOTES
Assessment & Plan     Acute nonintractable headache, unspecified headache type  Significant workup performed at the emergency room showing no signs of a ischemic event or aneurysm.  No life-threatening etiology.  No known etiology to her acute symptoms.  Atypical migraine is likely.  Has not had any recurrent symptoms similar to the ER.  Still having ongoing neck pain however.  This may be a combination of a cervicalgia resulting in tension headache and triggering migraine.  Imitrex given if migraine symptoms develop in the future.  Otherwise, given headache as well as unilateral face weakness per patient as well as numbness and tingling and weakness in the left arm with symptoms lasting approximately 1 hour, 1 must consider possible TIA.  Given young age and lack of known ischemic risk factors, this is felt less likely.  However she was given a referral to neurology and I recommend she maintain this.  In case rare TIA was experienced I do recommend at this time taking 81 mg of aspirin daily until she consults with neurology.  - Primary Care Referral  - SUMAtriptan (IMITREX) 25 MG tablet; Take 1-2 tablets (25-50 mg) by mouth at onset of headache for migraine. May repeat in 2 hours. Max 8 tablets/24 hours.  - aspirin 81 MG EC tablet; Take 1 tablet (81 mg) by mouth daily.  Medication use and side effects discussed with the patient. Patient is in complete understanding and agreement with plan.     Neck pain on left side  As above.  Ongoing discomfort.  Recommended physical therapy as well.  Patient agrees.  - Primary Care Referral  - Physical Therapy  Referral; Future      Subjective   Caitlyn is a 39 year old, presenting for the following health issues:  ER F/U (Headache, vomiting )        4/15/2025     2:31 PM   Additional Questions   Roomed by Sonia RIVERO CMA   Accompanied by Self     HPI          ED/UC Followup:    Facility:  [facility]  Date of visit: 4/4/25  Reason for visit: Headache and vomiting  "  Current Status: Still having headaches, left side of head feels numb. Right eye will then twitch      Patient is a 39-year-old female.  She presents today for ER follow-up after presenting to the emergency room on 4 April due to acute severe headache with left-sided numbness and tingling of the face as well as extending down to left arm with associated weakness.  This numbness and tingling lasted for approximately 1 hour and then resolved.  Evaluation at the emergency room included CT of head as well as CTA of neck and MRI of brain.  All within normal limits.  It was felt after consultation with neurology she likely suffered a atypical migraine.  She was given Toradol and Zofran with improvement of symptoms.  Discharged home with Reglan.    Since being discharged home, headaches have persisted though more mild.  Mostly localized to her left side of her neck extending into her left side of her head.  No further numbness and tingling or weakness.  She states she has no past history of known migraines.  Does have a history of recurrent neck discomfort but no history of chronic headaches.    Father with a history of brain tumor.        Objective    /69 (BP Location: Left arm, Patient Position: Sitting, Cuff Size: Adult Regular)   Pulse 68   Temp 98.4  F (36.9  C) (Oral)   Resp 16   Ht 1.48 m (4' 10.25\")   Wt 70.9 kg (156 lb 6 oz)   LMP 03/24/2025 (Approximate)   SpO2 99%   BMI 32.40 kg/m    Body mass index is 32.4 kg/m .  Physical Exam   GENERAL: alert and no distress  RESP: lungs clear to auscultation - no rales, rhonchi or wheezes  CV: regular rates and rhythm  NEURO: Normal strength and tone, mentation intact and speech normal  PSYCH: mentation appears normal, affect normal/bright          Signed Electronically by: Ramón Roberts PA-C    "

## 2025-04-16 ENCOUNTER — APPOINTMENT (OUTPATIENT)
Dept: INTERPRETER SERVICES | Facility: CLINIC | Age: 40
End: 2025-04-16
Payer: COMMERCIAL

## 2025-05-06 ENCOUNTER — THERAPY VISIT (OUTPATIENT)
Dept: PHYSICAL THERAPY | Facility: REHABILITATION | Age: 40
End: 2025-05-06
Attending: PHYSICIAN ASSISTANT
Payer: COMMERCIAL

## 2025-05-06 DIAGNOSIS — M54.2 NECK PAIN ON LEFT SIDE: Primary | ICD-10-CM

## 2025-05-06 PROCEDURE — 97110 THERAPEUTIC EXERCISES: CPT | Mod: GP | Performed by: PHYSICAL THERAPIST

## 2025-05-06 PROCEDURE — 97161 PT EVAL LOW COMPLEX 20 MIN: CPT | Mod: GP | Performed by: PHYSICAL THERAPIST

## 2025-05-06 NOTE — PROGRESS NOTES
PHYSICAL THERAPY EVALUATION  Type of Visit: Evaluation       Fall Risk Screen:  Have you fallen 2 or more times in the past year?: No  Have you fallen and had an injury in the past year?: No    Subjective         Presenting condition or subjective complaint: neck pain  Pain started about a month ago in the afternoon was getting a pulsatile sensation in the back of the head, and into the neck. Dizzy blurry vision as well. She did report vomiting blood at the time as well. She did go to the ED. Has had headaches in the past, but not like that day. She did have imaging in the ED which was clear for any major concerns. Since then, the pain has been mild. Has been given asprin and migraine medication has not had to take. Feels neck pain is still present low level, not sure if it is related to stress. Does also report some back pain that is low back up to the upper back. Feels like air in the back, hard to position for bed.  Pain Described as left sided neck pain that is intermittent, is neck an achy mild pain. No tingling/numbness into the arm. Currently headache is mild back of the head. No change in bowel/bladder control, balance.  Worse with carrying her baby (2 years 5 months), turning the neck.  Better with heat and ice packs, massage.    Date of onset: 04/15/25    Relevant medical history: Dizziness; Migraines or headaches; Pain at night or rest; Severe dizziness; Severe headaches   Dates & types of surgery: appendicitis gallbladder and tubal ligation    Prior diagnostic imaging/testing results: MRI; CT scan; X-ray     Prior therapy history for the same diagnosis, illness or injury: No      Prior Level of Function  Transfers:   Ambulation:   ADL:   IADL:     Living Environment  Social support: With a significant other or spouse   Type of home: House   Stairs to enter the home: Yes 12 Is there a railing: Yes     Ramp: No   Stairs inside the home: Yes 12 Is there a railing: Yes     Help at home: None  Equipment  owned:       Employment: No    Hobbies/Interests:      Patient goals for therapy: turning side ways with out pain    Pain assessment: Pain present     Objective   Precautions/Restrictions: none  Involved side: left  Posture Observation:      Fair- mild scapular protraction    Cervical ROM:    Date: 5/6/2025   *Indicate scale AROM   Cervical Flexion 40 deg   Cervical Extension 35 deg    Right Left   Cervical Sidebending 35 deg 20 deg   Cervical Rotation 50 deg pain 45 deg pain   Thoracic Rotation       Strength     Date: 5/6/2025   Cervical Myotomes/5 Right Left   Cervical Flexion (C1-2)     Cervical Sidebending (C3)     Shoulder Elevation (C4) 5 5   Shoulder Abduction (C5) 5 5   Elbow Flexion (C6) 5 5   Elbow Extension (C7) 5 5   Wrist Flexion (C7)     Wrist Extension (C6)     Thumb abduction (C8)     Finger Abduction (T1)       Sensation         Reflex Testing  Cervical Dermatomes Right Left UE Reflexes Right Left   Back of the Head (C2)   Biceps (C5-6)     Supraclavicular Fossa (C3)   Brachioradialis (C5-6)     AC Joint (C4)   Triceps (C7-8)     Lateral Biceps (C5)   Aiyana's test     Palmar Thumb (C6)   LE Reflexes     Palmar 3rd Finger (C7)   Patellar (L3-4)     Palmar 5th Finger (C8)   Achilles (S1-2)     Ulnar Forearm (T1)   Babinski Response       Flexibility: dec UT, levator    Palpation: tightness with pain to L scalene, SCM, cervical paraspinals, UT, levator, bilateral suboccipitals    Passive Mobility-Joint Integrity: dec closing on left facets C3-7    Cervical Special Tests     Cervical Special Tests Right Left UE Nerve Mobility Right Left   Cervical compression   Median nerve     Cervical distraction   Ulnar nerve     Spurling's test - + neck pain on R (quadrant test) Radial nerve     Shoulder abduction sign   Thoracic outlet     Deep neck flexor endurance test 10 sec 10 sec Lena        Tanishaon's     Sharper-Adrienne   Cervical rotation lateral flexion     Alar ligament test   Other:     Transverse  Ligament Test   Other:       Assessment & Plan   CLINICAL IMPRESSIONS  Medical Diagnosis: Acute nonintractable headache, unspecified headache type  Neck pain on left side    Treatment Diagnosis: neck pain, dec neck ROM, neck weakness, poor posture   Impression/Assessment: Patient is a 39 year old female with left sided neck pain and headache complaints.  The following significant findings have been identified: Pain, Decreased ROM/flexibility, Decreased joint mobility, Decreased strength, Impaired muscle performance, Decreased activity tolerance, and Impaired posture. These impairments interfere with their ability to perform self care tasks, recreational activities, household chores, driving , and /holding child  as compared to previous level of function. Pain likely related to cervical muscle tension and decreased facet mobility on left.    Clinical Decision Making (Complexity):  Clinical Presentation: Stable/Uncomplicated  Clinical Presentation Rationale: based on medical and personal factors listed in PT evaluation  Clinical Decision Making (Complexity): Low complexity    PLAN OF CARE  Treatment Interventions:  Interventions: Manual Therapy, Neuromuscular Re-education, Therapeutic Activity, Therapeutic Exercise, Self-Care/Home Management    Long Term Goals     PT Goal 1  Goal Description: Patient will be able to turn neck > 60 degrees without increased pain in 8 weeks  Target Date: 07/01/25  PT Goal 2  Goal Description: Patient will be able to carry her baby without increased painin 8 weeks  Target Date: 07/01/25  PT Goal 3  Goal Description: Patient will be able to demo HEP for self management of condition in 8 weeks  Target Date: 07/01/25      Frequency of Treatment: 1X/week  Duration of Treatment: up to 6 vists over 12 weeks    Recommended Referrals to Other Professionals:   Education Assessment:   Learner/Method: Patient  Education Comments: PT POC, HEP    Risks and benefits of evaluation/treatment  have been explained.   Patient/Family/caregiver agrees with Plan of Care.     Evaluation Time:     PT Eval, Low Complexity Minutes (53014): 25       Signing Clinician: Thony Jaeger, [facility]                                                                                   OUTPATIENT PHYSICAL THERAPY      PLAN OF TREATMENT FOR OUTPATIENT REHABILITATION   Patient's Last Name, First Name, MCaitlyn Wise YOB: 1985   Provider's Name   [facility]   Medical Record No.  [id number]     Onset Date: 04/15/25  Start of Care Date: 05/06/25     Medical Diagnosis:  Acute nonintractable headache, unspecified headache type  Neck pain on left side      PT Treatment Diagnosis:  neck pain, dec neck ROM, neck weakness, poor posture Plan of Treatment  Frequency/Duration: 1X/week/ up to 6 vists over 12 weeks    Certification date from 05/06/25 to 07/29/25         See note for plan of treatment details and functional goals     Thony Jaeger, PT                         I CERTIFY THE NEED FOR THESE SERVICES FURNISHED UNDER        THIS PLAN OF TREATMENT AND WHILE UNDER MY CARE     (Physician attestation of this document indicates review and certification of the therapy plan).              Referring Provider:  Ramón Roberts    Initial Assessment  See Epic Evaluation- Start of Care Date: 05/06/25

## 2025-08-10 ENCOUNTER — HEALTH MAINTENANCE LETTER (OUTPATIENT)
Age: 40
End: 2025-08-10

## (undated) DEVICE — ESU HANDLE PROBE HND CNTRL PENCIL GRIP STRL DISP EPH04

## (undated) DEVICE — TUBING SUCTION MEDI-VAC 1/4"X20' N620A - HE

## (undated) DEVICE — SUTURE VICRYL+ 4-0 UNDYED PS-2 VCP496H

## (undated) DEVICE — CLIP APPLIER ENDO 5MM M/L LIGAMAX EL5ML

## (undated) DEVICE — GLOVE BIOGEL PI ULTRATOUCH G SZ 6.0 42160

## (undated) DEVICE — PLATE GROUNDING ADULT W/CORD 9165L

## (undated) DEVICE — DECANTER VIAL 2006S

## (undated) DEVICE — ENDO TROCAR SLEEVE KII Z-THREADED 05X100MM CTS02

## (undated) DEVICE — TUBING SMOKE EVAC PNEUMOCLEAR HIGH FLOW 0620050250

## (undated) DEVICE — GOWN IMPERVIOUS BREATHABLE SMART LG 89015

## (undated) DEVICE — TUBING IRRIG TUR Y TYPE 96" LF 6543-01

## (undated) DEVICE — ENDO TROCAR FIRST ENTRY KII FIOS Z-THRD 05X100MM CTF03

## (undated) DEVICE — SOL ADH LIQUID BENZOIN SWAB 0.6ML C1544

## (undated) DEVICE — ENDO TROCAR FIRST ENTRY KII FIOS Z-THRD 11X100MM CTF33

## (undated) DEVICE — PREP CHLORAPREP 26ML TINTED HI-LITE ORANGE 930815

## (undated) DEVICE — BASIN EMESIS STERILE  SSK9005A

## (undated) DEVICE — ESU ELECTRODE MONOPOLAR 5MM DIA 34CML SHAFT L-HO EPS01

## (undated) DEVICE — CUSTOM PACK LAP CHOLE SBA5BLCHEA

## (undated) DEVICE — SU VICRYL+ 0 27 UR6 VLT VCP603H

## (undated) DEVICE — ENDO SHEARS RENEW LAP ENDOCUT SCISSOR TIP 16.5MM 3142

## (undated) DEVICE — SUCTION MANIFOLD NEPTUNE 2 SYS 1 PORT 702-025-000

## (undated) DEVICE — BLADE KNIFE SURG 11 371111

## (undated) DEVICE — SOL WATER IRRIG 1000ML BOTTLE 2F7114